# Patient Record
Sex: MALE | Race: WHITE | NOT HISPANIC OR LATINO | URBAN - METROPOLITAN AREA
[De-identification: names, ages, dates, MRNs, and addresses within clinical notes are randomized per-mention and may not be internally consistent; named-entity substitution may affect disease eponyms.]

---

## 2020-09-22 PROCEDURE — 88305 TISSUE EXAM BY PATHOLOGIST: CPT | Performed by: INTERNAL MEDICINE

## 2021-01-07 ENCOUNTER — LAB REQUISITION (OUTPATIENT)
Dept: LAB | Age: 66
End: 2021-01-07

## 2021-01-07 ENCOUNTER — EXTERNAL RECORD (OUTPATIENT)
Dept: OTHER | Age: 66
End: 2021-01-07

## 2021-01-07 DIAGNOSIS — E78.5 HYPERLIPIDEMIA, UNSPECIFIED: ICD-10-CM

## 2021-01-07 DIAGNOSIS — Z12.5 ENCOUNTER FOR SCREENING FOR MALIGNANT NEOPLASM OF PROSTATE: ICD-10-CM

## 2021-07-06 ENCOUNTER — OFFICE VISIT (OUTPATIENT)
Dept: FAMILY MEDICINE CLINIC | Facility: CLINIC | Age: 66
End: 2021-07-06
Payer: MEDICARE

## 2021-07-06 VITALS
HEIGHT: 66 IN | HEART RATE: 74 BPM | BODY MASS INDEX: 29.09 KG/M2 | SYSTOLIC BLOOD PRESSURE: 133 MMHG | DIASTOLIC BLOOD PRESSURE: 79 MMHG | WEIGHT: 181 LBS

## 2021-07-06 DIAGNOSIS — Z12.5 PROSTATE CANCER SCREENING: ICD-10-CM

## 2021-07-06 DIAGNOSIS — E78.5 HYPERLIPIDEMIA, UNSPECIFIED HYPERLIPIDEMIA TYPE: Primary | ICD-10-CM

## 2021-07-06 DIAGNOSIS — E55.9 VITAMIN D DEFICIENCY: ICD-10-CM

## 2021-07-06 PROCEDURE — 99202 OFFICE O/P NEW SF 15 MIN: CPT | Performed by: FAMILY MEDICINE

## 2021-07-06 RX ORDER — NAPROXEN 500 MG/1
TABLET ORAL
COMMUNITY
End: 2021-07-26

## 2021-07-06 RX ORDER — NAPROXEN 500 MG/1
500 TABLET ORAL 2 TIMES DAILY WITH MEALS
Qty: 60 TABLET | Refills: 1 | Status: SHIPPED | OUTPATIENT
Start: 2021-07-06 | End: 2021-07-26

## 2021-07-06 NOTE — PROGRESS NOTES
Blood pressure 133/79, pulse 74, height 5' 6\" (1.676 m), weight 181 lb (82.1 kg). Patient presents today for initial visit. Intermittent back pain none at this time. Gets relief with naproxen. He otherwise feels well. Denies chest pain or dyspnea.

## 2021-07-08 ENCOUNTER — LAB ENCOUNTER (OUTPATIENT)
Dept: LAB | Age: 66
End: 2021-07-08
Attending: FAMILY MEDICINE
Payer: MEDICARE

## 2021-07-08 DIAGNOSIS — Z12.5 PROSTATE CANCER SCREENING: ICD-10-CM

## 2021-07-08 DIAGNOSIS — E55.9 VITAMIN D DEFICIENCY: ICD-10-CM

## 2021-07-08 DIAGNOSIS — E78.5 HYPERLIPIDEMIA, UNSPECIFIED HYPERLIPIDEMIA TYPE: ICD-10-CM

## 2021-07-08 LAB
ALBUMIN SERPL-MCNC: 3.7 G/DL (ref 3.4–5)
ALBUMIN/GLOB SERPL: 1 {RATIO} (ref 1–2)
ALP LIVER SERPL-CCNC: 47 U/L
ALT SERPL-CCNC: 27 U/L
ANION GAP SERPL CALC-SCNC: 6 MMOL/L (ref 0–18)
AST SERPL-CCNC: 19 U/L (ref 15–37)
BILIRUB SERPL-MCNC: 0.6 MG/DL (ref 0.1–2)
BUN BLD-MCNC: 15 MG/DL (ref 7–18)
BUN/CREAT SERPL: 16.3 (ref 10–20)
CALCIUM BLD-MCNC: 9.2 MG/DL (ref 8.5–10.1)
CHLORIDE SERPL-SCNC: 108 MMOL/L (ref 98–112)
CHOLEST SMN-MCNC: 204 MG/DL (ref ?–200)
CO2 SERPL-SCNC: 27 MMOL/L (ref 21–32)
COMPLEXED PSA SERPL-MCNC: 0.61 NG/ML (ref ?–4)
CREAT BLD-MCNC: 0.92 MG/DL
GLOBULIN PLAS-MCNC: 3.8 G/DL (ref 2.8–4.4)
GLUCOSE BLD-MCNC: 89 MG/DL (ref 70–99)
HDLC SERPL-MCNC: 58 MG/DL (ref 40–59)
LDLC SERPL CALC-MCNC: 126 MG/DL (ref ?–100)
M PROTEIN MFR SERPL ELPH: 7.5 G/DL (ref 6.4–8.2)
NONHDLC SERPL-MCNC: 146 MG/DL (ref ?–130)
OSMOLALITY SERPL CALC.SUM OF ELEC: 292 MOSM/KG (ref 275–295)
PATIENT FASTING Y/N/NP: YES
PATIENT FASTING Y/N/NP: YES
POTASSIUM SERPL-SCNC: 4.7 MMOL/L (ref 3.5–5.1)
SODIUM SERPL-SCNC: 141 MMOL/L (ref 136–145)
TRIGL SERPL-MCNC: 115 MG/DL (ref 30–149)
TSI SER-ACNC: 1.17 MIU/ML (ref 0.36–3.74)
VLDLC SERPL CALC-MCNC: 20 MG/DL (ref 0–30)

## 2021-07-08 PROCEDURE — 36415 COLL VENOUS BLD VENIPUNCTURE: CPT

## 2021-07-08 PROCEDURE — 84443 ASSAY THYROID STIM HORMONE: CPT

## 2021-07-08 PROCEDURE — 80061 LIPID PANEL: CPT

## 2021-07-08 PROCEDURE — 82306 VITAMIN D 25 HYDROXY: CPT

## 2021-07-08 PROCEDURE — 80053 COMPREHEN METABOLIC PANEL: CPT

## 2021-07-09 LAB — 25(OH)D3 SERPL-MCNC: 24.8 NG/ML (ref 30–100)

## 2021-07-26 ENCOUNTER — HOSPITAL ENCOUNTER (OUTPATIENT)
Dept: GENERAL RADIOLOGY | Age: 66
Discharge: HOME | End: 2021-07-26
Attending: FAMILY MEDICINE
Payer: MEDICARE

## 2021-07-26 ENCOUNTER — OFFICE VISIT (OUTPATIENT)
Dept: FAMILY MEDICINE CLINIC | Facility: CLINIC | Age: 66
End: 2021-07-26
Payer: MEDICARE

## 2021-07-26 ENCOUNTER — TELEPHONE (OUTPATIENT)
Dept: FAMILY MEDICINE CLINIC | Facility: CLINIC | Age: 66
End: 2021-07-26

## 2021-07-26 VITALS
WEIGHT: 182.63 LBS | HEART RATE: 75 BPM | DIASTOLIC BLOOD PRESSURE: 80 MMHG | BODY MASS INDEX: 29.35 KG/M2 | SYSTOLIC BLOOD PRESSURE: 120 MMHG | HEIGHT: 66 IN

## 2021-07-26 DIAGNOSIS — E55.9 VITAMIN D DEFICIENCY: ICD-10-CM

## 2021-07-26 DIAGNOSIS — G89.29 CHRONIC BILATERAL LOW BACK PAIN, UNSPECIFIED WHETHER SCIATICA PRESENT: ICD-10-CM

## 2021-07-26 DIAGNOSIS — Z00.00 MEDICARE ANNUAL WELLNESS VISIT, INITIAL: Primary | ICD-10-CM

## 2021-07-26 DIAGNOSIS — M54.50 CHRONIC BILATERAL LOW BACK PAIN, UNSPECIFIED WHETHER SCIATICA PRESENT: ICD-10-CM

## 2021-07-26 DIAGNOSIS — E78.5 HYPERLIPIDEMIA, UNSPECIFIED HYPERLIPIDEMIA TYPE: ICD-10-CM

## 2021-07-26 PROCEDURE — 99213 OFFICE O/P EST LOW 20 MIN: CPT | Performed by: FAMILY MEDICINE

## 2021-07-26 PROCEDURE — 72110 X-RAY EXAM L-2 SPINE 4/>VWS: CPT | Performed by: FAMILY MEDICINE

## 2021-07-26 RX ORDER — METHYLPREDNISOLONE 4 MG/1
TABLET ORAL
Qty: 1 EACH | Refills: 1 | Status: SHIPPED | OUTPATIENT
Start: 2021-07-26 | End: 2021-08-23 | Stop reason: ALTCHOICE

## 2021-07-26 NOTE — PROGRESS NOTES
Blood pressure 120/80, pulse 75, height 5' 6\" (1.676 m), weight 182 lb 9.6 oz (82.8 kg). Patient presents today following up for chronic low back pain. No pain down his legs no trauma. No bowel or bladder dysfunction.   Has seen multiple back speciali

## 2021-07-26 NOTE — PATIENT INSTRUCTIONS
Instrucciones médicas anticipadas  Un formulario de instrucciones médicas anticipadas le permite a usted planear con anticipación el nivel de atención médica que quisiera recibir en seda de no poder expresar lo que desea.  Esta declaración describe el tra recuperará. · Entra en efecto solamente cuando usted ya no puede expresar hiren deseos. © 6805-5986 [employer] [address] Todos los derechos reservados.  Esta información no pretende sustituir la atención médica profesional. Sólo sanchez médico puede diag · Manténgase tan activo amauri pueda. Un buen equilibrio, flexibilidad, fortaleza y resistencia son producto del ejercicio, y todos juegan un papel importante en la prevención de las caídas.  Pregúntele a sanchez proveedor de [employer] qué tipo de actividade

## 2021-07-26 NOTE — TELEPHONE ENCOUNTER
Patient was seen in office today and left MRI original copy in office. He is requesting if he can get original copy back and for us to keep a copy of it. Please call back, let him know when he can come pick it up.

## 2021-08-16 ENCOUNTER — HOSPITAL ENCOUNTER (OUTPATIENT)
Dept: MRI IMAGING | Age: 66
Discharge: HOME | End: 2021-08-16
Attending: FAMILY MEDICINE
Payer: MEDICARE

## 2021-08-16 ENCOUNTER — TELEPHONE (OUTPATIENT)
Dept: FAMILY MEDICINE CLINIC | Facility: CLINIC | Age: 66
End: 2021-08-16

## 2021-08-16 DIAGNOSIS — G89.29 CHRONIC BILATERAL LOW BACK PAIN, UNSPECIFIED WHETHER SCIATICA PRESENT: ICD-10-CM

## 2021-08-16 DIAGNOSIS — M54.50 CHRONIC BILATERAL LOW BACK PAIN, UNSPECIFIED WHETHER SCIATICA PRESENT: ICD-10-CM

## 2021-08-16 PROCEDURE — 72148 MRI LUMBAR SPINE W/O DYE: CPT | Performed by: FAMILY MEDICINE

## 2021-08-16 NOTE — TELEPHONE ENCOUNTER
----- Message from Jose Andrade DO sent at 8/16/2021  4:46 PM CDT -----  Moderate stenosis seen on MRI please inquire if patient has had any success with physical therapy.

## 2021-08-23 ENCOUNTER — OFFICE VISIT (OUTPATIENT)
Dept: FAMILY MEDICINE CLINIC | Facility: CLINIC | Age: 66
End: 2021-08-23
Payer: MEDICARE

## 2021-08-23 VITALS
BODY MASS INDEX: 28.93 KG/M2 | HEART RATE: 84 BPM | HEIGHT: 66 IN | DIASTOLIC BLOOD PRESSURE: 80 MMHG | SYSTOLIC BLOOD PRESSURE: 129 MMHG | WEIGHT: 180 LBS

## 2021-08-23 DIAGNOSIS — E78.2 MIXED HYPERLIPIDEMIA: Primary | ICD-10-CM

## 2021-08-23 PROCEDURE — 99213 OFFICE O/P EST LOW 20 MIN: CPT | Performed by: FAMILY MEDICINE

## 2021-08-23 NOTE — PROGRESS NOTES
Blood pressure 129/80, pulse 84, height 5' 6\" (1.676 m), weight 180 lb (81.6 kg). Following up for chronic low back pain. No pain down the legs. Has not yet started physical therapy. Did not use Medrol Dosepak.     Objective    Spinal stenosis seen m

## 2021-09-01 ENCOUNTER — OFFICE VISIT (OUTPATIENT)
Dept: PHYSICAL THERAPY | Age: 66
End: 2021-09-01
Attending: FAMILY MEDICINE
Payer: MEDICARE

## 2021-09-01 DIAGNOSIS — M54.50 CHRONIC BILATERAL LOW BACK PAIN, UNSPECIFIED WHETHER SCIATICA PRESENT: ICD-10-CM

## 2021-09-01 DIAGNOSIS — G89.29 CHRONIC BILATERAL LOW BACK PAIN, UNSPECIFIED WHETHER SCIATICA PRESENT: ICD-10-CM

## 2021-09-01 PROCEDURE — 97110 THERAPEUTIC EXERCISES: CPT

## 2021-09-01 PROCEDURE — 97140 MANUAL THERAPY 1/> REGIONS: CPT

## 2021-09-01 PROCEDURE — 97161 PT EVAL LOW COMPLEX 20 MIN: CPT

## 2021-09-01 NOTE — PROGRESS NOTES
LUMBAR SPINE EVALUATION:   Referring Physician: Dr. Rehan Lopes  Diagnosis: Chronic bilateral low back pain, unspecified whether sciatica present (M54.5,G89.29)    Date of Service: 9/1/2021     PATIENT SUMMARY   Hannah Dickey is a 77year old y/o male who not currently seeing a chiropractor. Current functional limitations include: heavy lifting, prolonged activity, bending, twisting, squatting    Milad Fernandes describes prior level of function: pt has had pain on and off for 30 years.  Unlimited function with ex glut and core strength with lumbar compensation noted with glut activation. Decreased strength may place increased load on pt's spine and contribute to presentation. Functional deficits include but are not limited to bending, twisting, squatting, lifting. soreness after evaluation, modalities as needed [ice/heat], detrimental fear avoidance behaviors, importance of remaining active and instruction on initial HEP.   Today's Treatment: prone: PA lower thoracic and lumbar, sideglides throughout lumbar, STM lumb please contact me at Dept: [phone]    Sincerely,  Electronically signed by therapist: Gracie Castro, PT,DPT,SB-C2    Physician's certification required: Yes  I certify the need for these services furnished under this plan of treatment and while und

## 2021-09-08 ENCOUNTER — OFFICE VISIT (OUTPATIENT)
Dept: PHYSICAL THERAPY | Age: 66
End: 2021-09-08
Attending: FAMILY MEDICINE
Payer: MEDICARE

## 2021-09-08 PROCEDURE — 97110 THERAPEUTIC EXERCISES: CPT

## 2021-09-08 PROCEDURE — 97140 MANUAL THERAPY 1/> REGIONS: CPT

## 2021-09-08 NOTE — PROGRESS NOTES
Diagnosis: Chronic bilateral low back pain, unspecified whether sciatica present (M54.5,G89.29)    Insurance (Authorized # of Visits):  Medicare (8-10 per POC)     Authorizing Physician: Dr. Calvin Mueller  Next MD visit: none scheduled  Fall Risk: standard + alt hip abd BTB 20x R/L    Supine TrA + bridging on heels with hip abd BTB 20x     Manual Therapy Prone: PA lumbar and mid-lower thoracic, sideglides lumbar, STM lumbar paraspinals with focus on R     Neuromuscular Re-education        Current HEP:   9/1:

## 2021-09-10 ENCOUNTER — OFFICE VISIT (OUTPATIENT)
Dept: PHYSICAL THERAPY | Age: 66
End: 2021-09-10
Attending: FAMILY MEDICINE
Payer: MEDICARE

## 2021-09-10 PROCEDURE — 97110 THERAPEUTIC EXERCISES: CPT

## 2021-09-10 PROCEDURE — 97140 MANUAL THERAPY 1/> REGIONS: CPT

## 2021-09-10 NOTE — PROGRESS NOTES
Diagnosis: Chronic bilateral low back pain, unspecified whether sciatica present (M54.5,G89.29)    Insurance (Authorized # of Visits):  Medicare (8-10 per POC)     Authorizing Physician: Dr. Marshall Lance  Next MD visit: none scheduled  Fall Risk: standard 20x    Supine pelvic clocks 3<>9 20x    Supine isometric march 65cm ball     Supine SB bridge feet on ball 2x10    Standing glut med hip abd RTB 10x R/L    Standing hip ext RTB 10x R/L    Manual Therapy Prone: PA lumbar and mid-lower thoracic, sideglides l

## 2021-09-14 ENCOUNTER — OFFICE VISIT (OUTPATIENT)
Dept: PHYSICAL THERAPY | Age: 66
End: 2021-09-14
Attending: FAMILY MEDICINE
Payer: MEDICARE

## 2021-09-14 PROCEDURE — 97110 THERAPEUTIC EXERCISES: CPT

## 2021-09-14 NOTE — PROGRESS NOTES
Diagnosis: Chronic bilateral low back pain, unspecified whether sciatica present (M54.5,G89.29)    Insurance (Authorized # of Visits):  Medicare (8-10 per POC)     Authorizing Physician: Dr. Mira Jane  Next MD visit: none scheduled  Fall Risk: standard only x5mins    Supine pelvic clock 12<>6 20x    Supine pelvic clocks 3<>9 20x    Supine isometric march 65cm ball     Supine SB bridge feet on ball 2x10    Standing glut med hip abd RTB 10x R/L    Standing hip ext RTB 10x R/L NuStep lvl 7 LEs only x5mins

## 2021-09-17 ENCOUNTER — APPOINTMENT (OUTPATIENT)
Dept: PHYSICAL THERAPY | Age: 66
End: 2021-09-17
Attending: FAMILY MEDICINE
Payer: MEDICARE

## 2021-09-21 ENCOUNTER — OFFICE VISIT (OUTPATIENT)
Dept: PHYSICAL THERAPY | Age: 66
End: 2021-09-21
Attending: FAMILY MEDICINE
Payer: MEDICARE

## 2021-09-21 PROCEDURE — 97110 THERAPEUTIC EXERCISES: CPT

## 2021-09-21 NOTE — PROGRESS NOTES
Diagnosis: Chronic bilateral low back pain, unspecified whether sciatica present (M54.5,G89.29)    Insurance (Authorized # of Visits):  Medicare (8-10 per POC)     Authorizing Physician: Dr. Viktor Ring  Next MD visit: none scheduled  Fall Risk: standard oblique 65cm ball [id number]    Supine TrA + alt hip abd BTB 20x R/L    Supine TrA + bridging on heels with hip abd BTB 20x NuStep lvl 5 up to 7 LEs only x5mins    Supine pelvic clock 12<>6 20x    Supine pelvic clocks 3<>9 20x    Supine isometric march 65cm min  Total Treatment Time: 40 min

## 2021-09-23 ENCOUNTER — OFFICE VISIT (OUTPATIENT)
Dept: PHYSICAL THERAPY | Age: 66
End: 2021-09-23
Attending: FAMILY MEDICINE
Payer: MEDICARE

## 2021-09-23 PROCEDURE — 97140 MANUAL THERAPY 1/> REGIONS: CPT

## 2021-09-23 PROCEDURE — 97110 THERAPEUTIC EXERCISES: CPT

## 2021-09-23 NOTE — PROGRESS NOTES
Diagnosis: Chronic bilateral low back pain, unspecified whether sciatica present (M54.5,G89.29)    Insurance (Authorized # of Visits):  Medicare (8-10 per POC)     Authorizing Physician: Dr. Tonya Cordon  Next MD visit: none scheduled  Fall Risk: standard 9/14/21  Tx#: 4 Date: 9/21/21  Tx#: 5   Therapeutic exercises Supine pelvic clocks 12<>6 10x - pain    Prone on elbows gentle mobilization into extension 10x    Supine pelvic clock 12<>6 10x    Supine TrA activation [id number]    Supine TrA + hip add with ba isometric core and oblique 65cm ball, doorway pec stretch - handout issued  9/23: issues pt handout on exercises to perform if flareup: FELI mobilization, pelvic clocks, TrA + hip add, TrA + hip abd    Charges:  TherEx x2, Man x1    Total Timed Treatment: 45

## 2021-09-28 ENCOUNTER — OFFICE VISIT (OUTPATIENT)
Dept: PHYSICAL THERAPY | Age: 66
End: 2021-09-28
Attending: FAMILY MEDICINE
Payer: MEDICARE

## 2021-09-28 PROCEDURE — 97140 MANUAL THERAPY 1/> REGIONS: CPT

## 2021-09-28 PROCEDURE — 97110 THERAPEUTIC EXERCISES: CPT

## 2021-09-28 NOTE — PROGRESS NOTES
Diagnosis: Chronic bilateral low back pain, unspecified whether sciatica present (M54.5,G89.29)    Insurance (Authorized # of Visits):  Medicare (8-10 per POC)     Authorizing Physician: Dr. Tricia Lott MD visit: none scheduled  Fall Risk: standard with use of biofeedback cuff to ensure offloading of spine Isabel Mason pose sustained hold, progressing stretch as tolerated [id number] ea    Supine pelvic clocks 12<>6 20x     Supine TrA + hip add with ball [id number]    Supine TrA + hip abd BTB [id number] NuStep lvl

## 2021-09-30 ENCOUNTER — OFFICE VISIT (OUTPATIENT)
Dept: PHYSICAL THERAPY | Age: 66
End: 2021-09-30
Attending: FAMILY MEDICINE
Payer: MEDICARE

## 2021-09-30 PROCEDURE — 97110 THERAPEUTIC EXERCISES: CPT

## 2021-09-30 NOTE — PROGRESS NOTES
Diagnosis: Chronic bilateral low back pain, unspecified whether sciatica present (M54.5,G89.29)    Insurance (Authorized # of Visits):  Medicare (8-10 per POC)     Authorizing Physician: Dr. Hill Horn  Next MD visit: none scheduled  Fall Risk: standard 12<>6 10x - pain    Prone on elbows gentle mobilization into extension 10x    Supine pelvic clock 12<>6 10x    Supine TrA activation [id number]    Supine TrA + hip add with ball [id number]    Supine TrA + hip abd BTB [id number]    Supine TrA with use of biofeedb mobilization, pelvic clocks, TrA + hip add, TrA + hip abd  9/28: hunter pose sustained hold to tolerance - handout issued  9/30: seated piriformis stretch    Plan:  Hold on PT x2-3 weeks for pt to assess independence with home program. Re-assess symptoms a

## 2021-10-20 ENCOUNTER — OFFICE VISIT (OUTPATIENT)
Dept: PHYSICAL THERAPY | Age: 66
End: 2021-10-20
Attending: PHYSICAL MEDICINE & REHABILITATION
Payer: MEDICARE

## 2021-10-20 PROCEDURE — 97110 THERAPEUTIC EXERCISES: CPT

## 2021-10-20 NOTE — PROGRESS NOTES
Lizandro  Pt has attended 9 visits in Physical Therapy.      Diagnosis: Chronic bilateral low back pain, unspecified whether sciatica present (M54.5,G89.29)    Insurance (Authorized # of Visits):  Medicare (8-10 per POC)     Authorizing Physician: -/5     Right Left Comments   Hip Flexion (L2) 5/5      5/5     Knee Extension (L3) 5/5 5/5     Knee Flexion 5/5 5/5     Ankle DF (L4) 5/5 5/5     EHL (L5) 5/5 5/5     Ankle PF (S1) 5/5 5/5     Hip Abduction 5/5 5/5     Hip Extension 5/5 5/5     Glut med mobilization into extension 10x    Supine pelvic clock 12<>6 10x    Supine TrA activation [id number]    Supine TrA + hip add with ball [id number]    Supine TrA + hip abd BTB [id number]    Supine TrA with use of biofeedback cuff to ensure offloading of spine Chil issued  9/30: seated piriformis stretch    Charges:  TherEx x3    Total Timed Treatment: 45 min  Total Treatment Time: 45 min

## 2021-11-04 ENCOUNTER — APPOINTMENT (OUTPATIENT)
Dept: PHYSICAL THERAPY | Age: 66
End: 2021-11-04
Attending: PHYSICAL MEDICINE & REHABILITATION
Payer: MEDICARE

## 2022-07-05 ENCOUNTER — TELEMEDICINE (OUTPATIENT)
Dept: FAMILY MEDICINE CLINIC | Facility: CLINIC | Age: 67
End: 2022-07-05

## 2022-07-05 DIAGNOSIS — L08.9 BLISTERING DISTAL DACTYLITIS: Primary | ICD-10-CM

## 2022-07-05 RX ORDER — PREDNISONE 10 MG/1
10 TABLET ORAL 3 TIMES DAILY
Qty: 15 TABLET | Refills: 0 | Status: SHIPPED | OUTPATIENT
Start: 2022-07-05

## 2022-07-05 NOTE — PROGRESS NOTES
Video visit    Patient presents complaining of swelling of the left middle finger. He reports that he has not traumatized it. He did have a small splinter in it that he noticed and removed this morning. The swelling began several days ago there is no pain. It does become discolored at times turning bluish. He is always able to move it. He does play the guitar. Objective swelling noted to the left middle finger freely movable mild discoloration    Assessment dactylitis    Plan prednisone prescription follow-up in 2 days if no improvement.

## 2022-07-11 ENCOUNTER — OFFICE VISIT (OUTPATIENT)
Dept: FAMILY MEDICINE CLINIC | Facility: CLINIC | Age: 67
End: 2022-07-11
Payer: MEDICARE

## 2022-07-11 ENCOUNTER — HOSPITAL ENCOUNTER (OUTPATIENT)
Dept: GENERAL RADIOLOGY | Age: 67
Discharge: HOME | End: 2022-07-11
Attending: FAMILY MEDICINE
Payer: MEDICARE

## 2022-07-11 VITALS
SYSTOLIC BLOOD PRESSURE: 117 MMHG | HEART RATE: 69 BPM | HEIGHT: 66 IN | BODY MASS INDEX: 28.93 KG/M2 | DIASTOLIC BLOOD PRESSURE: 64 MMHG | WEIGHT: 180 LBS

## 2022-07-11 DIAGNOSIS — M79.89 FINGER SWELLING: ICD-10-CM

## 2022-07-11 DIAGNOSIS — Z12.5 PROSTATE CANCER SCREENING: ICD-10-CM

## 2022-07-11 DIAGNOSIS — E55.9 VITAMIN D DEFICIENCY: Primary | ICD-10-CM

## 2022-07-11 DIAGNOSIS — E78.2 MIXED HYPERLIPIDEMIA: ICD-10-CM

## 2022-07-11 PROCEDURE — 99213 OFFICE O/P EST LOW 20 MIN: CPT | Performed by: FAMILY MEDICINE

## 2022-07-11 PROCEDURE — 73130 X-RAY EXAM OF HAND: CPT | Performed by: FAMILY MEDICINE

## 2022-07-11 NOTE — PROGRESS NOTES
Blood pressure 117/64, pulse 69, height 5' 6\" (1.676 m), weight 180 lb (81.6 kg). Left middle finger still swollen. Since minimal relief with the prednisone no pain. He did pull a splinter out of it at some point after the swelling had begun but it was very small. No fever no chills no night sweats.   No other joint discomfort  Objective distal left middle finger with swelling    Assessment finger swelling    Plan x-ray ordered and referral to Dr. Franco Luna blood test ordered

## 2022-07-12 ENCOUNTER — LAB ENCOUNTER (OUTPATIENT)
Dept: LAB | Age: 67
End: 2022-07-12
Attending: FAMILY MEDICINE
Payer: MEDICARE

## 2022-07-12 DIAGNOSIS — E55.9 VITAMIN D DEFICIENCY: ICD-10-CM

## 2022-07-12 DIAGNOSIS — E78.2 MIXED HYPERLIPIDEMIA: ICD-10-CM

## 2022-07-12 DIAGNOSIS — Z12.5 PROSTATE CANCER SCREENING: ICD-10-CM

## 2022-07-12 LAB
ALBUMIN SERPL-MCNC: 3.4 G/DL (ref 3.4–5)
ALBUMIN/GLOB SERPL: 0.9 {RATIO} (ref 1–2)
ALP LIVER SERPL-CCNC: 56 U/L
ALT SERPL-CCNC: 23 U/L
ANION GAP SERPL CALC-SCNC: 4 MMOL/L (ref 0–18)
AST SERPL-CCNC: 19 U/L (ref 15–37)
BILIRUB SERPL-MCNC: 0.6 MG/DL (ref 0.1–2)
BUN BLD-MCNC: 14 MG/DL (ref 7–18)
BUN/CREAT SERPL: 14 (ref 10–20)
CALCIUM BLD-MCNC: 9.1 MG/DL (ref 8.5–10.1)
CHLORIDE SERPL-SCNC: 106 MMOL/L (ref 98–112)
CHOLEST SERPL-MCNC: 164 MG/DL (ref ?–200)
CO2 SERPL-SCNC: 27 MMOL/L (ref 21–32)
COMPLEXED PSA SERPL-MCNC: 0.56 NG/ML (ref ?–4)
CREAT BLD-MCNC: 1 MG/DL
FASTING PATIENT LIPID ANSWER: YES
FASTING STATUS PATIENT QL REPORTED: YES
GLOBULIN PLAS-MCNC: 3.7 G/DL (ref 2.8–4.4)
GLUCOSE BLD-MCNC: 82 MG/DL (ref 70–99)
HDLC SERPL-MCNC: 60 MG/DL (ref 40–59)
LDLC SERPL CALC-MCNC: 90 MG/DL (ref ?–100)
NONHDLC SERPL-MCNC: 104 MG/DL (ref ?–130)
OSMOLALITY SERPL CALC.SUM OF ELEC: 284 MOSM/KG (ref 275–295)
POTASSIUM SERPL-SCNC: 4 MMOL/L (ref 3.5–5.1)
PROT SERPL-MCNC: 7.1 G/DL (ref 6.4–8.2)
SODIUM SERPL-SCNC: 137 MMOL/L (ref 136–145)
TRIGL SERPL-MCNC: 70 MG/DL (ref 30–149)
TSI SER-ACNC: 1.2 MIU/ML (ref 0.36–3.74)
VIT D+METAB SERPL-MCNC: 28.6 NG/ML (ref 30–100)
VLDLC SERPL CALC-MCNC: 11 MG/DL (ref 0–30)

## 2022-07-12 PROCEDURE — 82306 VITAMIN D 25 HYDROXY: CPT

## 2022-07-12 PROCEDURE — 80061 LIPID PANEL: CPT

## 2022-07-12 PROCEDURE — 84443 ASSAY THYROID STIM HORMONE: CPT

## 2022-07-12 PROCEDURE — 80053 COMPREHEN METABOLIC PANEL: CPT

## 2022-07-12 PROCEDURE — 36415 COLL VENOUS BLD VENIPUNCTURE: CPT

## 2022-07-28 ENCOUNTER — OFFICE VISIT (OUTPATIENT)
Dept: SURGERY | Facility: CLINIC | Age: 67
End: 2022-07-28
Payer: MEDICARE

## 2022-07-28 DIAGNOSIS — M19.042 PRIMARY OSTEOARTHRITIS OF LEFT HAND: Primary | ICD-10-CM

## 2022-07-28 PROCEDURE — 99204 OFFICE O/P NEW MOD 45 MIN: CPT | Performed by: PLASTIC SURGERY

## 2022-10-10 RX ORDER — ERGOCALCIFEROL 1.25 MG/1
50000 CAPSULE ORAL WEEKLY
Qty: 12 CAPSULE | Refills: 0 | Status: SHIPPED | OUTPATIENT
Start: 2022-10-10

## 2022-10-10 NOTE — TELEPHONE ENCOUNTER
Please review.  Protocol failed/No protocol      Requested Prescriptions   Pending Prescriptions Disp Refills    ERGOCALCIFEROL 1.25 MG (58119 UT) Oral Cap [Pharmacy Med Name: VITAMIN D2 50,000IU (ERGO) CAP RX] 12 capsule 0     Sig: TAKE 1 CAPSULE BY MOUTH 1 TIME A WEEK        There is no refill protocol information for this order           Recent Outpatient Visits              2 months ago Primary osteoarthritis of left hand    [facility],[address] Floor, [address] [place] [address],[address], [place] Farhana Koch MD    Office Visit    3 months ago Vitamin D deficiency    Nic Arevalo DO    Office Visit    3 months ago Blistering distal dactylitis    Nic Arevalo DO    Telemedicine    11 months ago     [facility]    Office Visit    1 year ago     [facility] [place], [place]    Office Visit

## 2023-07-17 ENCOUNTER — NURSE TRIAGE (OUTPATIENT)
Dept: FAMILY MEDICINE CLINIC | Facility: CLINIC | Age: 68
End: 2023-07-17

## 2023-07-17 DIAGNOSIS — H53.9 VISION CHANGES: Primary | ICD-10-CM

## 2023-07-17 NOTE — TELEPHONE ENCOUNTER
Action Requested: Summary for Provider     []  Critical Lab, Recommendations Needed  [] Need Additional Advice  []   FYI    []   Need Orders  [] Need Medications Sent to Pharmacy  []  Other     SUMMARY: Pt calling, states when a bright light flashes, he suddenly goes blind, then vision comes back. Occurred 3 times in the last few weeks. Denies pain, swelling, discharge, recent injuries. Pt initially requesting for an ophthalmologist referral.  [facility] ophthalmology department, spoke with Bonnie Cushing, call transferred to Bonnie Cushing to speak with Pt and recommends to initiate referral for Dr. Marissa Maloney for evaluation. Please advise. Referral pended. To provider in [address] [place] [address] for Dr. Briana Low.       Reason for call: Eye Problem (Right eye-bright light, goes blind)  Onset: Data Unavailable    Future Appointments   Date Time Provider Dagoberto Becerra   7/20/2023  1:30 PM Marisol Gutierrez MD [facility]                      Reason for Disposition   Blurred vision or visual changes and gradual onset (e.g., weeks, months)    Protocols used: Vision Loss or Change-A-OH

## 2023-07-20 ENCOUNTER — OFFICE VISIT (OUTPATIENT)
Dept: OPHTHALMOLOGY | Facility: CLINIC | Age: 68
End: 2023-07-20

## 2023-07-20 DIAGNOSIS — H25.13 AGE-RELATED NUCLEAR CATARACT OF BOTH EYES: ICD-10-CM

## 2023-07-20 DIAGNOSIS — H53.9 VISUAL DISTURBANCE: Primary | ICD-10-CM

## 2023-07-20 PROCEDURE — 92004 COMPRE OPH EXAM NEW PT 1/>: CPT | Performed by: OPHTHALMOLOGY

## 2023-07-20 NOTE — PROGRESS NOTES
Siobhan Boyd is a 79year old male. HPI:     HPI    New patient complains that if someone shines a bright light in his eyes he loses his sight for about 30 seconds in the right eye. This started about a month ago. He states it doesn't happen in the left eye, and it doesn't happen all the time. He also complains that he notices floaters described as clear worms in the right eye for a couple of months which is random and doesn't happen with bright light in eyes. Consult: Jeanna Burton (PA)    Last edited by Tate Abreu O.T. on 7/20/2023  2:02 PM.        Patient History:  Past Medical History:   Diagnosis Date    Back pain 04/05/2007    Hyperlipidemia        Surgical History: Siobhan Boyd has a past surgical history that includes colonoscopy (09/2020); colonoscopy (N/A, 9/22/2020) (Procedure: COLONOSCOPY, POSSIBLE BIOPSY, POSSIBLE POLYPECTOMY 26274;  Surgeon: Easton Munguia MD;  Location: [address] [place] [address]); and arthroscopy, shoulder, surgi (Right, 04/02/2008) (03/23/2009, 11/25/2009, ). Family History   Problem Relation Age of Onset    Heart Disorder Mother 80        Heart Attack    Other (Tumor) Father 76        Brain Tumor    Heart Disorder Sister Viktor Nathan        [facility] actual Dx name    Heart Disorder Brother Hoang Ambershire        Quartupal Bypas    Stroke Brother 58    Diabetes Neg     Glaucoma Neg     Macular degeneration Neg        Social History:   Social History     Socioeconomic History    Marital status:    Tobacco Use    Smoking status: Never    Smokeless tobacco: Never   Vaping Use    Vaping Use: Never used   Substance and Sexual Activity    Alcohol use: Yes     Alcohol/week: 1.0 standard drink of alcohol     Types: 1 Cans of beer per week     Comment: social    Drug use: Never       Medications:  Current Outpatient Medications   Medication Sig Dispense Refill    Multiple Vitamin (MULTIVITAMIN TAB/CAP) Take by mouth daily.         B Complex Oral Tab 1 tablet daily      Garlic-DHA-EPA (GARLIC/EPA OR) garlic      ergocalciferol 1.25 MG (82194 UT) Oral Cap Take 1 capsule (50,000 Units total) by mouth once a week. 12 capsule 0       Allergies:  No Known Allergies    ROS:     ROS    Positive for: Eyes  Negative for: Constitutional, Gastrointestinal, Neurological, Skin, Genitourinary, Musculoskeletal, HENT, Endocrine, Cardiovascular, Respiratory, Psychiatric, Allergic/Imm, Heme/Lymph  Last edited by Ana Fan [email or web address] on 7/20/2023  2:04 PM.          PHYSICAL EXAM:     Base Eye Exam       Visual Acuity (Snellen - Linear)         Right Left    Dist sc 20/20 20/20              Tonometry (Icare, 2:00 PM)         Right Left    Pressure 16 17              Pupils         Pupils    Right PERRL    Left PERRL              Dilation       Both eyes: 1.0% Mydriacyl and 2.5% Azael Synephrine x2 @ 2:00 PM                  Additional Tests       Amsler         Right Left     Normal Normal                  Slit Lamp and Fundus Exam       Slit Lamp Exam         Right Left    Lids/Lashes Dermatochalasis, Meibomian gland dysfunction Dermatochalasis, Meibomian gland dysfunction    Conjunctiva/Sclera Normal Normal    Cornea 1+ Arcus 1+ Arcus    Anterior Chamber Deep and quiet Deep and quiet    Iris Normal Normal    Lens 1+ Nuclear sclerosis 1+ Nuclear sclerosis    Vitreous Clear, no floater seen Clear, no floater seen              Fundus Exam         Right Left    Disc Good rim, Temporal crescent Good rim, Temporal crescent    C/D Ratio 0.6 0.6    Macula Normal Normal    Vessels Normal Normal    Periphery Normal Normal                     ASSESSMENT/PLAN:     Diagnoses and Plan:     Visual disturbance  No cause found for visual disturbance in the right eye. Reassured patient that there is no retina issues, no optic nerve issues, no infection, no inflammation or increased pressure in his eyes. Information given to see Neuro-Ophthalmology for consultation and treatment.  Patient will call if he wants a second opinion. Age-related nuclear cataract of both eyes  Discussed early cataracts with patient. Told patient that cataracts are age appropriate and they are not surgical at this time. No treatment recommended at this time. No orders of the defined types were placed in this encounter.       Meds This Visit:  Requested Prescriptions      No prescriptions requested or ordered in this encounter        Follow up instructions:  Return in about 1 year (around 7/20/2024) for EE.    7/20/2023  Scribed by: Yara Chavira MD

## 2023-07-20 NOTE — ASSESSMENT & PLAN NOTE
No cause found for visual disturbance in the right eye. Reassured patient that there is no retina issues, no optic nerve issues, no infection, no inflammation or increased pressure in his eyes. Information given to see Neuro-Ophthalmology for consultation and treatment. Patient will call if he wants a second opinion.

## 2023-07-20 NOTE — PATIENT INSTRUCTIONS
Visual disturbance  No cause found for visual disturbance in the right eye. Reassured patient that there is no retina issues, no optic nerve issues, no infection, no inflammation or increased pressure in his eyes. Information given to see Neuro-Ophthalmology for consultation and treatment. Patient will call if he wants a second opinion. Age-related nuclear cataract of both eyes  Discussed early cataracts with patient. Told patient that cataracts are age appropriate and they are not surgical at this time. No treatment recommended at this time.

## 2024-04-02 ENCOUNTER — HOSPITAL ENCOUNTER (INPATIENT)
Facility: HOSPITAL | Age: 69
LOS: 2 days | Discharge: HOME | End: 2024-04-05
Attending: EMERGENCY MEDICINE | Admitting: STUDENT IN AN ORGANIZED HEALTH CARE EDUCATION/TRAINING PROGRAM
Payer: MEDICARE

## 2024-04-02 ENCOUNTER — APPOINTMENT (OUTPATIENT)
Dept: INTERVENTIONAL RADIOLOGY/VASCULAR | Facility: HOSPITAL | Age: 69
End: 2024-04-02
Attending: INTERNAL MEDICINE
Payer: MEDICARE

## 2024-04-02 ENCOUNTER — APPOINTMENT (OUTPATIENT)
Dept: GENERAL RADIOLOGY | Facility: HOSPITAL | Age: 69
End: 2024-04-02
Attending: EMERGENCY MEDICINE
Payer: MEDICARE

## 2024-04-02 DIAGNOSIS — I21.3 ACUTE ST ELEVATION MYOCARDIAL INFARCTION (STEMI), UNSPECIFIED ARTERY (HCC): Primary | ICD-10-CM

## 2024-04-02 LAB
ALBUMIN SERPL-MCNC: 4.7 G/DL (ref 3.2–4.8)
ALBUMIN/GLOB SERPL: 1.3 {RATIO} (ref 1–2)
ALP LIVER SERPL-CCNC: 65 U/L
ALT SERPL-CCNC: 23 U/L
ANION GAP SERPL CALC-SCNC: 7 MMOL/L (ref 0–18)
APTT PPP: 26.8 SECONDS (ref 23.3–35.6)
AST SERPL-CCNC: 47 U/L (ref ?–34)
BASOPHILS # BLD AUTO: 0.03 X10(3) UL (ref 0–0.2)
BASOPHILS NFR BLD AUTO: 0.4 %
BILIRUB SERPL-MCNC: 0.4 MG/DL (ref 0.2–1.1)
BNP SERPL-MCNC: 18 PG/ML
BUN BLD-MCNC: 18 MG/DL (ref 9–23)
BUN/CREAT SERPL: 15.9 (ref 10–20)
CALCIUM BLD-MCNC: 10 MG/DL (ref 8.7–10.4)
CHLORIDE SERPL-SCNC: 107 MMOL/L (ref 98–112)
CHOLEST SERPL-MCNC: 210 MG/DL (ref ?–200)
CO2 SERPL-SCNC: 28 MMOL/L (ref 21–32)
CREAT BLD-MCNC: 1.13 MG/DL
DEPRECATED RDW RBC AUTO: 46.8 FL (ref 35.1–46.3)
EGFRCR SERPLBLD CKD-EPI 2021: 71 ML/MIN/1.73M2 (ref 60–?)
EOSINOPHIL # BLD AUTO: 0.13 X10(3) UL (ref 0–0.7)
EOSINOPHIL NFR BLD AUTO: 1.8 %
ERYTHROCYTE [DISTWIDTH] IN BLOOD BY AUTOMATED COUNT: 13 % (ref 11–15)
GLOBULIN PLAS-MCNC: 3.5 G/DL (ref 2.8–4.4)
GLUCOSE BLD-MCNC: 112 MG/DL (ref 70–99)
HCT VFR BLD AUTO: 46.9 %
HDLC SERPL-MCNC: 62 MG/DL (ref 40–59)
HGB BLD-MCNC: 15.5 G/DL
IMM GRANULOCYTES # BLD AUTO: 0.02 X10(3) UL (ref 0–1)
IMM GRANULOCYTES NFR BLD: 0.3 %
LDLC SERPL CALC-MCNC: 115 MG/DL (ref ?–100)
LYMPHOCYTES # BLD AUTO: 3.21 X10(3) UL (ref 1–4)
LYMPHOCYTES NFR BLD AUTO: 45.1 %
MCH RBC QN AUTO: 32.4 PG (ref 26–34)
MCHC RBC AUTO-ENTMCNC: 33 G/DL (ref 31–37)
MCV RBC AUTO: 97.9 FL
MONOCYTES # BLD AUTO: 0.68 X10(3) UL (ref 0.1–1)
MONOCYTES NFR BLD AUTO: 9.6 %
NEUTROPHILS # BLD AUTO: 3.04 X10 (3) UL (ref 1.5–7.7)
NEUTROPHILS # BLD AUTO: 3.04 X10(3) UL (ref 1.5–7.7)
NEUTROPHILS NFR BLD AUTO: 42.8 %
NONHDLC SERPL-MCNC: 148 MG/DL (ref ?–130)
OSMOLALITY SERPL CALC.SUM OF ELEC: 297 MOSM/KG (ref 275–295)
PLATELET # BLD AUTO: 197 10(3)UL (ref 150–450)
POTASSIUM SERPL-SCNC: 4 MMOL/L (ref 3.5–5.1)
PROT SERPL-MCNC: 8.2 G/DL (ref 5.7–8.2)
RBC # BLD AUTO: 4.79 X10(6)UL
SODIUM SERPL-SCNC: 142 MMOL/L (ref 136–145)
TRIGL SERPL-MCNC: 190 MG/DL (ref 30–149)
TROPONIN I SERPL HS-MCNC: 2470 NG/L
TSI SER-ACNC: 2.86 MIU/ML (ref 0.55–4.78)
VLDLC SERPL CALC-MCNC: 33 MG/DL (ref 0–30)
WBC # BLD AUTO: 7.1 X10(3) UL (ref 4–11)

## 2024-04-02 PROCEDURE — B2111ZZ FLUOROSCOPY OF MULTIPLE CORONARY ARTERIES USING LOW OSMOLAR CONTRAST: ICD-10-PCS | Performed by: INTERNAL MEDICINE

## 2024-04-02 PROCEDURE — 4A023N7 MEASUREMENT OF CARDIAC SAMPLING AND PRESSURE, LEFT HEART, PERCUTANEOUS APPROACH: ICD-10-PCS | Performed by: INTERNAL MEDICINE

## 2024-04-02 PROCEDURE — B240ZZ3 ULTRASONOGRAPHY OF SINGLE CORONARY ARTERY, INTRAVASCULAR: ICD-10-PCS | Performed by: INTERNAL MEDICINE

## 2024-04-02 PROCEDURE — 027034Z DILATION OF CORONARY ARTERY, ONE ARTERY WITH DRUG-ELUTING INTRALUMINAL DEVICE, PERCUTANEOUS APPROACH: ICD-10-PCS | Performed by: INTERNAL MEDICINE

## 2024-04-02 PROCEDURE — B2151ZZ FLUOROSCOPY OF LEFT HEART USING LOW OSMOLAR CONTRAST: ICD-10-PCS | Performed by: INTERNAL MEDICINE

## 2024-04-02 PROCEDURE — 71045 X-RAY EXAM CHEST 1 VIEW: CPT | Performed by: EMERGENCY MEDICINE

## 2024-04-02 PROCEDURE — 99223 1ST HOSP IP/OBS HIGH 75: CPT | Performed by: HOSPITALIST

## 2024-04-02 RX ORDER — NITROGLYCERIN 20 MG/100ML
INJECTION INTRAVENOUS
Status: COMPLETED | OUTPATIENT
Start: 2024-04-02 | End: 2024-04-02

## 2024-04-02 RX ORDER — NITROGLYCERIN 20 MG/100ML
INJECTION INTRAVENOUS
Status: COMPLETED
Start: 2024-04-02 | End: 2024-04-02

## 2024-04-02 RX ORDER — ASPIRIN 81 MG/1
324 TABLET, CHEWABLE ORAL ONCE
Status: COMPLETED | OUTPATIENT
Start: 2024-04-02 | End: 2024-04-02

## 2024-04-02 RX ORDER — HEPARIN SODIUM AND DEXTROSE 10000; 5 [USP'U]/100ML; G/100ML
INJECTION INTRAVENOUS CONTINUOUS
Status: DISCONTINUED | OUTPATIENT
Start: 2024-04-03 | End: 2024-04-03

## 2024-04-02 RX ORDER — ADENOSINE 3 MG/ML
INJECTION, SOLUTION INTRAVENOUS
Status: COMPLETED
Start: 2024-04-02 | End: 2024-04-02

## 2024-04-02 RX ORDER — HEPARIN SODIUM 1000 [USP'U]/ML
60 INJECTION, SOLUTION INTRAVENOUS; SUBCUTANEOUS ONCE
Status: COMPLETED | OUTPATIENT
Start: 2024-04-02 | End: 2024-04-02

## 2024-04-02 RX ORDER — HEPARIN SODIUM AND DEXTROSE 10000; 5 [USP'U]/100ML; G/100ML
12 INJECTION INTRAVENOUS ONCE
Status: COMPLETED | OUTPATIENT
Start: 2024-04-02 | End: 2024-04-03

## 2024-04-02 RX ORDER — METOPROLOL TARTRATE 1 MG/ML
INJECTION, SOLUTION INTRAVENOUS
Status: DISCONTINUED
Start: 2024-04-02 | End: 2024-04-02 | Stop reason: WASHOUT

## 2024-04-02 RX ORDER — MIDAZOLAM HYDROCHLORIDE 1 MG/ML
INJECTION INTRAMUSCULAR; INTRAVENOUS
Status: COMPLETED
Start: 2024-04-02 | End: 2024-04-02

## 2024-04-02 RX ORDER — LIDOCAINE HYDROCHLORIDE 20 MG/ML
INJECTION, SOLUTION EPIDURAL; INFILTRATION; INTRACAUDAL; PERINEURAL
Status: COMPLETED
Start: 2024-04-02 | End: 2024-04-02

## 2024-04-02 RX ORDER — METOPROLOL TARTRATE 1 MG/ML
INJECTION, SOLUTION INTRAVENOUS
Status: COMPLETED | OUTPATIENT
Start: 2024-04-02 | End: 2024-04-02

## 2024-04-02 RX ORDER — HEPARIN SODIUM 1000 [USP'U]/ML
INJECTION, SOLUTION INTRAVENOUS; SUBCUTANEOUS
Status: COMPLETED
Start: 2024-04-02 | End: 2024-04-02

## 2024-04-02 RX ORDER — NITROGLYCERIN 0.4 MG/1
TABLET SUBLINGUAL
Status: COMPLETED | OUTPATIENT
Start: 2024-04-02 | End: 2024-04-02

## 2024-04-03 ENCOUNTER — APPOINTMENT (OUTPATIENT)
Dept: CV DIAGNOSTICS | Facility: HOSPITAL | Age: 69
End: 2024-04-03
Attending: INTERNAL MEDICINE
Payer: MEDICARE

## 2024-04-03 LAB
APTT PPP: 111.4 SECONDS (ref 23.3–35.6)
ATRIAL RATE: 65 BPM
ATRIAL RATE: 99 BPM
ISTAT ACTIVATED CLOTTING TIME: 217 SECONDS (ref 125–137)
ISTAT ACTIVATED CLOTTING TIME: 282 SECONDS (ref 125–137)
P AXIS: 66 DEGREES
P AXIS: 73 DEGREES
P-R INTERVAL: 140 MS
P-R INTERVAL: 146 MS
Q-T INTERVAL: 330 MS
Q-T INTERVAL: 374 MS
QRS DURATION: 66 MS
QRS DURATION: 86 MS
QTC CALCULATION (BEZET): 388 MS
QTC CALCULATION (BEZET): 423 MS
R AXIS: 47 DEGREES
R AXIS: 7 DEGREES
T AXIS: 155 DEGREES
T AXIS: 71 DEGREES
VENTRICULAR RATE: 65 BPM
VENTRICULAR RATE: 99 BPM

## 2024-04-03 PROCEDURE — 93306 TTE W/DOPPLER COMPLETE: CPT | Performed by: INTERNAL MEDICINE

## 2024-04-03 PROCEDURE — 99223 1ST HOSP IP/OBS HIGH 75: CPT | Performed by: INTERNAL MEDICINE

## 2024-04-03 PROCEDURE — 99233 SBSQ HOSP IP/OBS HIGH 50: CPT | Performed by: INTERNAL MEDICINE

## 2024-04-03 RX ORDER — HEPARIN SODIUM 5000 [USP'U]/ML
5000 INJECTION, SOLUTION INTRAVENOUS; SUBCUTANEOUS EVERY 12 HOURS SCHEDULED
Status: DISCONTINUED | OUTPATIENT
Start: 2024-04-03 | End: 2024-04-05

## 2024-04-03 RX ORDER — WATER 10 ML/10ML
INJECTION INTRAMUSCULAR; INTRAVENOUS; SUBCUTANEOUS
Status: COMPLETED
Start: 2024-04-03 | End: 2024-04-03

## 2024-04-03 RX ORDER — ATORVASTATIN CALCIUM 80 MG/1
80 TABLET, FILM COATED ORAL NIGHTLY
Status: DISCONTINUED | OUTPATIENT
Start: 2024-04-03 | End: 2024-04-05

## 2024-04-03 RX ORDER — ASPIRIN 81 MG/1
81 TABLET ORAL DAILY
Status: DISCONTINUED | OUTPATIENT
Start: 2024-04-04 | End: 2024-04-05

## 2024-04-03 RX ORDER — HYDROCODONE BITARTRATE AND ACETAMINOPHEN 5; 325 MG/1; MG/1
1 TABLET ORAL EVERY 4 HOURS PRN
Status: DISCONTINUED | OUTPATIENT
Start: 2024-04-03 | End: 2024-04-05

## 2024-04-03 RX ORDER — SODIUM CHLORIDE 9 MG/ML
INJECTION, SOLUTION INTRAVENOUS CONTINUOUS
Status: ACTIVE | OUTPATIENT
Start: 2024-04-03 | End: 2024-04-03

## 2024-04-03 RX ORDER — ACETAMINOPHEN 500 MG
1000 TABLET ORAL EVERY 6 HOURS PRN
Status: DISCONTINUED | OUTPATIENT
Start: 2024-04-03 | End: 2024-04-05

## 2024-04-03 RX ORDER — HYDROMORPHONE HYDROCHLORIDE 1 MG/ML
0.5 INJECTION, SOLUTION INTRAMUSCULAR; INTRAVENOUS; SUBCUTANEOUS EVERY 2 HOUR PRN
Status: DISCONTINUED | OUTPATIENT
Start: 2024-04-03 | End: 2024-04-05

## 2024-04-03 NOTE — OPERATIVE REPORT
Full note dictated,    50 to 70% circumflex  Large ramus is patent  Smaller circumflex 100% occluded proximally  No significant disease in a large right coronary artery    Circumflex recanalized and stented with 2.5 x 16 mm NENA.  This extended from just at the AV branch backward.  Smaller posterolateral branch had minimal disease and was not covered as the sizing was approximately 2.25 mm.    No reflow was noted.  Stent was optimized using a 3 mm balloon.  Intravascular ultrasound interrogation revealed no evidence of dissection proximally or distally.  Multiple aliquots of adenosine and nitroglycerin were given via a catheter into the circumflex both proximal mid and distal to the stent.  With this, patient had return back to CLINTON II and a half to CLINTON-3 flow.  As there was no chest discomfort, and resolution of EKG changes I decided to terminate the procedure.    Note, cangrelor was also started.  We will continue this through the morning and switch over.  Does have at least a moderate proximal LAD stenosis which may need to be assessed prior to discharge.      Manolo Sanchez MD

## 2024-04-03 NOTE — CONSULTS
[facility] Cardiology  Report of Consultation    Glenn Torres Patient Status:  Emergency    1955 MRN [id number]   Location [facility] EMERGENCY DEPARTMENT Attending Jaja Jacobson MD   Hosp Day # 0 PCP Jose Croft DO     Reason for Consultation:  Inferior wall MI    History of Present Illness:  Glenn Torres is a a(n) 68 year old male with previous history of hyperlipidemia who presented today complaining of chest discomfort which he describes as a bad heartburn.  Pain apparently started about 3 hours ago it has been continuous.  It is currently about a 4 out of 10.    Patient had episodes of pain yesterday that came and went.    Patient denies any previous cardiac history.  Denies any recent illnesses.  He has not had any history of stroke or any bleeding issues.  I there has been no fevers chills or cough.    History:  Past Medical History:   Diagnosis Date    Back pain 2007    Hyperlipidemia      Past Surgical History:   Procedure Laterality Date    ARTHROSCOPY, SHOULDER, SURGI Right 2008, 2009,     COLONOSCOPY  2020    COLONOSCOPY N/A 2020    Procedure: COLONOSCOPY, POSSIBLE BIOPSY, POSSIBLE POLYPECTOMY 43600;  Surgeon: Rivas Ledesma MD;  Location: [facility], [facility]     Family History   Problem Relation Age of Onset    Heart Disorder Mother 83        Heart Attack    Other (Tumor) Father 75        Brain Tumor    Heart Disorder Sister 65         actual Dx name    Heart Disorder Brother 65        Quartupal Bypas    Stroke Brother 62    Diabetes Neg     Glaucoma Neg     Macular degeneration Neg       reports that he has never smoked. He has never used smokeless tobacco. He reports current alcohol use of about 1.0 standard drink of alcohol per week. He reports that he does not use drugs.    Allergies:  No Known Allergies    Medications:    Current Facility-Administered Medications:     nitroglycerin (Nitrostat) SL tab, , Sublingual,  Code/Trauma Med    metoprolol (Lopressor) 5 mg/5mL injection, , Intravenous, Code/Trauma Med    heparin (Porcine) 48149 units/250mL infusion ED INITIAL DOSE, 12 Units/kg/hr, Intravenous, Once    [START ON 4/3/2024] heparin (Porcine) 25133 units/250mL infusion ACS/AFIB CONTINUOUS, 200-3,000 Units/hr, Intravenous, Continuous    nitroGLYCERIN in dextrose 5% 50 mg/250mL infusion premix, , Intravenous, Code/Trauma Continuous Med    Review of Systems:  A comprehensive 10 point review of systems was completed.  Pertinent positives and negatives noted in the the HPI.     Physical Exam:  Blood pressure (!) 150/100, pulse 63, temperature 97.5 °F (36.4 °C), temperature source Temporal, resp. rate 11, height 5' 6\" (1.676 m), weight 165 lb (74.8 kg), SpO2 98%.  Temp (24hrs), Av.5 °F (36.4 °C), Min:97.5 °F (36.4 °C), Max:97.5 °F (36.4 °C)    Wt Readings from Last 3 Encounters:   24 165 lb (74.8 kg)   22 180 lb (81.6 kg)   21 180 lb (81.6 kg)       Telemetry: Sinus  General: Alert and oriented in no apparent distress.  HEENT: EOMI, no scleral icterus, mucosa moist  Neck: Supple, carotids 2+   Cardiac: Regular rate and rhythm   Lungs: Clear   Abdomen: Soft, non-tender.   Extremities: Without clubbing, cyanosis or edema  Neurologic: Alert and oriented, normal affect.  Skin: Warm and dry.     Laboratories and Data:  Diagnostics:    Labs:   No results for input(s): \"TROP\", \"CK\" in the last 168 hours.   Recent Labs   Lab 24  2210   RBC 4.79   HGB 15.5   HCT 46.9   MCV 97.9   MCH 32.4   MCHC 33.0   RDW 13.0   NEPRELIM 3.04   WBC 7.1   .0     Recent Labs   Lab 24  2210   *   BUN 18   CREATSERUM 1.13   CA 10.0   ALB 4.7      K 4.0      CO2 28.0   ALKPHO 65   AST 47*   ALT 23   BILT 0.4   TP 8.2      No results found for: \"PT\", \"INR\"     Assessment/Plan:    CV -patient presents with chest discomfort and EKG changes suggest an inferior posterior myocardial infarction.  Case been  discussed with patient and family at bedside.  I recommended urgent angiography.   Risks, benefits, and alternatives of cardiac cath/PCI discussed in detail.  Risks include but not limited to death, MI, CVA, emergency CABG, transfusion, and surgical repair of vascular complications. Questions answered.  Patient agrees to proceed.  2.    Lipids -patient will need high-dose statins      Manolo Sanchez MD  4/2/2024  10:48 PM  C5

## 2024-04-03 NOTE — ED PROVIDER NOTES
Patient Seen in: [facility] Emergency Department      History     Chief Complaint   Patient presents with    Chest Pain     Stated Complaint: chest pain    Subjective:   HPI    The patient is a 68-year-old male with high cholesterol who presents with intermittent substernal chest pain since yesterday.  Tonight it became worse with associated diaphoresis.  No nausea or shortness of breath.  No pleuritic pain.  No leg pain or swelling.  No recent illness cough fevers or chills    Objective:   Past Medical History:   Diagnosis Date    Back pain 04/05/2007    Hyperlipidemia               No pertinent past surgical history.          Social history: No smoking  Family history.  Brother with quadruple bypass in his 60s  No pertinent social history.            Review of Systems    Positive for stated complaint: chest pain  Other systems are as noted in HPI.  Constitutional and vital signs reviewed.      All other systems reviewed and negative except as noted above.    Physical Exam     ED Triage Vitals   BP 04/02/24 2157 (!) 169/88   Pulse 04/02/24 2157 70   Resp 04/02/24 2157 20   Temp 04/02/24 2211 97.5 °F (36.4 °C)   Temp src 04/02/24 2211 Temporal   SpO2 04/02/24 2157 99 %   O2 Device 04/02/24 2157 None (Room air)       Current:/83 (BP Location: Left arm)   Pulse 58   Temp 97.6 °F (36.4 °C) (Temporal)   Resp 14   Ht 167.6 cm (5' 6\")   Wt 76.2 kg   SpO2 97%   BMI 27.11 kg/m²         Physical Exam  Vitals and nursing note reviewed.   Constitutional:       General: He is not in acute distress.     Appearance: Normal appearance. He is well-developed. He is not ill-appearing.   HENT:      Head: Normocephalic and atraumatic.      Mouth/Throat:      Mouth: Mucous membranes are moist.   Eyes:      Conjunctiva/sclera: Conjunctivae normal.      Pupils: Pupils are equal, round, and reactive to light.   Neck:      Vascular: No JVD.   Cardiovascular:      Rate and Rhythm: Normal rate and regular rhythm.       Heart sounds: Normal heart sounds. No murmur heard.  Pulmonary:      Effort: Pulmonary effort is normal.      Breath sounds: Normal breath sounds.   Abdominal:      General: Bowel sounds are normal. There is no distension.      Palpations: Abdomen is soft. There is no mass.      Tenderness: There is no abdominal tenderness. There is no guarding or rebound.   Musculoskeletal:         General: Normal range of motion.      Cervical back: Normal range of motion and neck supple.      Right lower leg: No tenderness. No edema.      Left lower leg: No tenderness. No edema.   Skin:     General: Skin is warm and dry.      Capillary Refill: Capillary refill takes less than 2 seconds.      Findings: No rash.   Neurological:      General: No focal deficit present.      Mental Status: He is alert and oriented to person, place, and time.      Deep Tendon Reflexes: Reflexes are normal and symmetric.   Psychiatric:         Judgment: Judgment normal.       Differential diagnosis includes but is not limited to acute coronary syndrome, GERD, pneumonia, pleurisy        ED Course     Labs Reviewed   COMP METABOLIC PANEL (14) - Abnormal; Notable for the following components:       Result Value    Glucose 112 (*)     Calculated Osmolality 297 (*)     AST 47 (*)     All other components within normal limits   LIPID PANEL - Abnormal; Notable for the following components:    Cholesterol, Total 210 (*)     HDL Cholesterol 62 (*)     Triglycerides 190 (*)     LDL Cholesterol 115 (*)     VLDL 33 (*)     Non HDL Chol 148 (*)     All other components within normal limits   TROPONIN I HIGH SENSITIVITY - Abnormal; Notable for the following components:    Troponin I (High Sensitivity) 2,470 (*)     All other components within normal limits   POCT ISTAT ACTIVATED CLOTTING TIME - Abnormal; Notable for the following components:    ISTAT Activated Clotting Time 282 (*)     All other components within normal limits   POCT ISTAT ACTIVATED CLOTTING TIME -  Abnormal; Notable for the following components:    ISTAT Activated Clotting Time 217 (*)     All other components within normal limits   CBC W/ DIFFERENTIAL - Abnormal; Notable for the following components:    RDW-SD 46.8 (*)     All other components within normal limits   TSH W REFLEX TO FREE T4 - Normal   PRO BETA NATRIURETIC PEPTIDE - Normal   PTT, ACTIVATED - Normal   CBC WITH DIFFERENTIAL WITH PLATELET    Narrative:     The following orders were created for panel order CBC With Differential With Platelet.  Procedure                               Abnormality         Status                     ---------                               -----------         ------                     CBC W/ DIFFERENTIAL[[id number]]          Abnormal            Final result                 Please view results for these tests on the individual orders.   PTT, ACTIVATED   RAINBOW DRAW LAVENDER   RAINBOW DRAW LIGHT GREEN   RAINBOW DRAW BLUE   RAINBOW DRAW GOLD     EKG    Rate, intervals and axes as noted on EKG Report.  Rate: 65  Rhythm: Sinus Rhythm  Reading: ST elevation 2 3 aVF with depression in V2 V3                          MDM      Pulse ox 99% on room air, normal  Admission disposition: 4/2/2024 10:14 PM                                        Medical Decision Making  Patient with acute ST elevation MI  Nitro heparin Lopressor and aspirin given in the ED patient taken directly to Cath Lab by [facility] cardiology  Discussed with hospitalist  Intensivist on consult    Problems Addressed:  Acute ST elevation myocardial infarction (STEMI), unspecified artery (HCC): acute illness or injury that poses a threat to life or bodily functions    Amount and/or Complexity of Data Reviewed  External Data Reviewed: radiology.     Details: CT calcium score in 2021 2.6  Labs: ordered.  Radiology: ordered and independent interpretation performed.     Details: No infiltrate other results per radiologist  ECG/medicine tests: ordered and independent  interpretation performed. Decision-making details documented in ED Course.  Discussion of management or test interpretation with external provider(s): Case discussed with hospitalist and [facility] cardiology    Risk  Decision regarding hospitalization.    Critical Care  Total time providing critical care: minutes (A total of 30 minutes of critical care time (exclusive of billable procedures) was administered to manage the patient's cardiovascular instability due to his acute MI.  This involved direct patient intervention, complex decision making, and/or extensive discussions with the patient, family, and clinical staff.  )        Disposition and Plan     Clinical Impression:  1. Acute ST elevation myocardial infarction (STEMI), unspecified artery (HCC)         Disposition:  Admit  4/2/2024 10:14 pm    Follow-up:  No follow-up provider specified.  We recommend that you schedule follow up care with a primary care provider within the next three months to obtain basic health screening including reassessment of your blood pressure.      Medications Prescribed:  Current Discharge Medication List                            Hospital Problems       Present on Admission  Date Reviewed: 7/20/2023            ICD-10-CM Noted POA    * (Principal) Acute ST elevation myocardial infarction (STEMI), unspecified artery (HCC) I21.3 4/2/2024 Unknown

## 2024-04-03 NOTE — PROGRESS NOTES
[facility]     Glenn Torres Patient Status:  Inpatient    1955 MRN [id number]   Location [facility] 2W/SW Attending Wing Martinez MD   Hosp Day # 0 PCP Jose Croft DO     Chief Complaint:   Chief Complaint   Patient presents with    Chest Pain        Subjective:     Patient seen sitting in bed.  Family at bedside.  Patient denies acute events overnight.  Patient reports feeling better today.  Denies current chest pain or shortness of breath.    Objective:      Vital signs:  Vitals:    24 0700 24 0800 24 0900 24 1000   BP: 129/87 130/80 122/87 117/72   BP Location: Left arm Left arm Right arm Right arm   Pulse: 59 71 101 94   Resp: 17 18 16 14   Temp:  97.7 °F (36.5 °C)     TempSrc:  Temporal     SpO2: 96% 95% 96% 96%   Weight:       Height:           Intake/Output Summary (Last 24 hours) at 4/3/2024 1123  Last data filed at 4/3/2024 0956  Gross per 24 hour   Intake 527.6 ml   Output 950 ml   Net -422.4 ml           Physical Exam:    GENERAL:  Awake and alert, in no acute distress.  HEART:  Regular rhythm, regular rate  LUNGS:  Air entry was minimally decreased.  No crackles or wheezes   ABDOMEN: Soft and non-tender.    PSYCHIATRIC: Normal mood    Diagnostic Data:    Labs:    Recent Labs   Lab 24  2210   WBC 7.1   HGB 15.5   MCV 97.9   .0       Recent Labs   Lab 24  2210   *   BUN 18   CREATSERUM 1.13   CA 10.0   ALB 4.7      K 4.0      CO2 28.0   ALKPHO 65   AST 47*   ALT 23   BILT 0.4   TP 8.2           Estimated Creatinine Clearance: 56.5 mL/min (based on SCr of 1.13 mg/dL).    No results for input(s): \"PTP\", \"INR\" in the last 168 hours.    Lab Results   Component Value Date    TSH 2.862 2024       COVID-19  Lab Results   Component Value Date    COVID19 Not Detected 2020       Pro-Calcitonin  No results for input(s): \"PCT\" in the last 168  hours.    Cardiac  No results for input(s): \"TROP\", \"PBNP\" in the last 168 hours.    Inflammatory Markers  No results for input(s): \"CRP\", \"ERIKA\", \"LDH\", \"DDIMER\" in the last 168 hours.    Culture:  No results found for this visit on 04/02/24.    No results found.    EKG 12 Lead    Result Date: 4/3/2024  Normal sinus rhythm ST & T wave abnormality, consider lateral ischemia Abnormal ECG When compared with ECG of 02-APR-2024 21:53, Vent. rate has increased BY  34 BPM ST no longer elevated in Inferior leads Non-specific change in ST segment in Lateral leads Nonspecific T wave abnormality now evident in Inferior leads T wave inversion now evident in Anterior-lateral leads    EKG 12 Lead    Result Date: 4/2/2024  Normal sinus rhythm with sinus arrhythmia ST elevation, consider early repolarization, pericarditis, or injury Nonspecific ST abnormality Abnormal ECG No previous ECGs found in [facility]     Medications:    [START ON 4/4/2024] aspirin  81 mg Oral Daily    atorvastatin  80 mg Oral Nightly    metoprolol tartrate  25 mg Oral 2x Daily(Beta Blocker)    ticagrelor  90 mg Oral Q12H       Assessment & Plan:        Acute ST elevation myocardial infarction (STEMI), unspecified artery (HCC)  -Patient presenting with Chest Pain  -Noted to have ST elevations on EKG.  -Initially started on heparin GTT  -Taken for cardiac catheterization.  Status post PCI of proximal circumflex on 4/2.  -Noted to have residual disease in the proximal to mid LAD.  Plan for repeat cardiac catheterization on 4/4.  -Telemetry monitoring.   -Heparin discontinued.  -Continue aspirin, ticagrelor, beta-blocker and statin.  -Cardiology consulted, appreciate further recommendations    HTN  - controlled  - CPM  - Monitor and adjust as needed    Hyperlipidemia  -Continue statin        Plan of care discussed with patient and family at bedside and with Rn. Discussed management/test result(s) with pulmonology consultant    Quality:  DVT Prophylaxis: Heparin  CODE  status: Full  Estimated date of discharge: TBD  Discharge is dependent on: clinical stability    Wing Martinez MD          This note was prepared using Dragon Medical voice recognition dictation software. As a result errors may occur. When identified these errors have been corrected. While every attempt is made to correct errors during dictation discrepancies may still exist

## 2024-04-03 NOTE — ED INITIAL ASSESSMENT (HPI)
Pt to ED with c/o mid sternal CP since yesterday. Pt describes the pain as burning sensation. Pt states CP resolved yesterday and come back today. Pain ongoing for about an hour now. Pt denies SOB, cough, n/v.

## 2024-04-03 NOTE — RESPIRATORY THERAPY NOTE
Pt states they have a hx/diagnosis of sleep apnea. Pt states they do not currently use CPAP at home. Pt refuses to use CPAP overnight during hospital stay.

## 2024-04-03 NOTE — PROCEDURES
[facility]    PATIENT'S NAME: FABI DUFFY   ATTENDING PHYSICIAN: Wing Martinez MD   OPERATING PHYSICIAN: Manolo Sanchez MD   PATIENT ACCOUNT#:   [id number]    LOCATION:  [address] [place] [address]  MEDICAL RECORD #:   [id number]       YOB: 1955  ADMISSION DATE:       04/02/2024      OPERATION DATE:  04/02/2024    CARDIAC PROCEDURE TRANSCRIPTION    CARDIAC CATHETERIZATION/PERCUTANEOUS CORONARY INTERVENTION     PREOPERATIVE DIAGNOSIS:    1.   Inferior wall myocardial infarction.    2.   Previous history of hyperlipidemia.  POSTOPERATIVE DIAGNOSIS:  Inferolateral myocardial infarction.   PROCEDURE PERFORMED:  Left heart catheterization, ventriculography, percutaneous transluminal coronary angioplasty with stent placement of the circumflex coronary artery, and intravascular ultrasound of the circumflex coronary artery.    SEDATION:  The patient received conscious sedation.  This was monitored by myself and the catheterization lab staff.  This started at 2305 and ended at 0039.    DESCRIPTION OF PROCEDURE:  After informed consent was obtained, patient was prepped and draped in usual sterile fashion.  Using ultrasound, right common femoral artery was identified and entered just above the bifurcation.  The patient had a high bifurcation.  A micropuncture technique was used.  A 6-Armenian sheath subsequently was placed.  Ewa right, left, and pigtail catheters were used for diagnostic procedure.  Please see details regarding angioplasty procedure below.  At the end of the case, a 6-Armenian Angio-Seal was used to seal the right common femoral artery site.  No apparent acute complications noted.  The patient tolerated the procedure well.    CORONARIES:  Injection of the left main coronary artery revealed no significant disease of the left main.  Left main trifurcates into the left anterior descending, circumflex, and ramus intermedius branches.  The left anterior descending artery has a  stenosis proximally about 50%.  This is eccentric and 50% to 70% just after the takeoff of a diagonal branch.  The rest of the LAD appears to be free of significant disease.  The second diagonal branch is free of significant disease.    There is a large ramus intermedius branch noted.  There is no significant disease noted in this branch.    The circumflex coronary artery is occluded in its proximal to mid section.    The right coronary artery is a large dominant vessel.  No significant obstructions are noted in the right coronary artery, its posterior descending or posterolateral branches.  No collaterals are noted to the circumflex.    DESCRIPTION OF ANGIOPLASTY:  After heparin was started to maintain ACT over 250 seconds, an EBU 3.5 mm guiding catheter was placed in the left main.  A 190 cm Runthrough wire was placed into posterolateral branch without difficulty.  The patient had return of CLINTON I flow with this.  The area of stenosis was ballooned with a 2.5 mm balloon.  With 2.5 mm balloon, CLINTON III flow was restored.  There was a high-grade stenosis of about 90% at the takeoff of AV branch of the circumflex.  There was very little runoff.  There was a much smaller or higher obtuse marginal branch.    Intravascular ultrasound interrogation was performed.  This revealed a lumen size of about 3 mm proximally and about 2.5 mm distally.  There appeared to be a clean area before the takeoff of the posterolateral branch and just at the level of the AV branch of the circumflex.  Therefore, I chose to put in a 2.5 x 16 drug-eluting stent from a normal area back into the proximal circumflex.  The posterolateral branch itself appeared to be of a much smaller caliber measuring between 2 and 2.5 mm.  There was mild nonobstructive disease.  Therefore, I chose not to stent this segment.    After the stent was placed, there appeared to be evidence of no reflow.  Intravascular ultrasound interrogation was performed.  This  revealed good placement of the stent both proximally and distally without evidence of dissection.  A 3.0 noncompliant balloon was used to perform high-pressure inflations throughout the stented segment.  I followed this up again with angiography which revealed again CLINTON I flow.  Aliquots of nitroglycerin and adenosine were given into the left main with minimal improvement.  Another interrogation with intravascular ultrasound revealed no significant residual stenosis.    Based on this, it was clear the patient was having no reflow phenomenon.  I felt majority of the contrast was going directly into the much larger ramus.  Therefore, using a Mehran blue wire, I placed a Corsair catheter into the main circumflex and again gave multiple aliquots of adenosine proximally along with nitroglycerin as well as beyond the stented segment into the posterolateral branch.  Injection of contrast within the stented segment revealed essentially normal flow.  There was refluxing of the contrast, but contrast appeared to clear quickly.  I waited another 10 to 20 minutes observing the vessel.  There was return of close to CLINTON III flow at the end.  The runoff vessels were very small with perhaps embolization of thrombus.  However, I did not see any cut-offs in the terminal vessels.  Given that the patient was hemodynamically stable without chest discomfort, I chose to terminate the procedure.  As noted above, intravascular ultrasound interrogation was again performed before I terminated.  This revealed excellent apposition without evidence of any distal dissection.  The proximal was slightly under deployed and could have used a 3.5 balloon.  However, I was concerned that I would restart this process of no reflow.  This observation is based on the fact that the stent was sized in the disease in the vessel without evidence of dissection.  Further optimization could be done; however, my concern was that we would potentially restart his no  reflow phenomenon.  Therefore, I chose not to do it.  I chose to finish the procedure for tonight.    VENTRICULOGRAPHY:  Ventriculography reveals normal left ventricular systolic function with an estimated ejection fraction of 60%.  There is very mild mid inferior wall hypokinesis.  Left ventricular end-diastolic pressure was about 12 mmHg.  No significant gradient was noted upon pullback across aortic valve.    SUMMARY:  In summary, the patient was demonstrated to have an occlusion of a smaller circumflex coronary artery.  He underwent PCI of this which led to no reflow phenomenon which slowly resolved with intracoronary adenosine and nitroglycerin as described above.     Dictated By Manolo Sanchez MD  d: 04/03/2024 00:49:44  t: 04/03/2024 01:59:46  [facility] [id number]  [facility]/    cc: Wing Martinez MD

## 2024-04-03 NOTE — ED INITIAL ASSESSMENT (HPI)
Pt to room with wife.   Cardiac alert called by ELÍAS Hadley.  PT reporting CP x 1 day, 6/10, burning.

## 2024-04-03 NOTE — PROGRESS NOTES
[facility] - CARDIOLOGY PROGRESS NOTE  Glenn Torres Patient Status:  Inpatient    1955 MRN [id number]   Location [facility] 2W/SW Attending Wing Martinez MD   Hosp Day # 0 PCP Jose Croft,      Assessment:    1.  CAD.  Presentation last evening with a acute inferoposterior wall STEMI.  Status post PCI of proximal circumflex.  Initial slow reflow, improved by the end of the case.  Patient pain-free now.    2.  Residual disease in proximal to mid LAD.      Plan:    Echocardiogram today to assess LV systolic function.    Discontinue cangrelor, begin oral Brilinta.    Discontinue heparin.    Begin metoprolol.    High-dose statin.    Return to lab tomorrow for staged intervention on LAD.      Subjective:  No chest pain or shortness of breath.    Objective:  /87 (BP Location: Left arm)   Pulse 59   Temp 97.8 °F (36.6 °C) (Temporal)   Resp 17   Ht 167.6 cm (5' 6\")   Wt 167 lb 15.9 oz (76.2 kg)   SpO2 96%   BMI 27.11 kg/m²     Temp (24hrs), Av.6 °F (36.4 °C), Min:97.5 °F (36.4 °C), Max:97.8 °F (36.6 °C)      Intake/Output:    Intake/Output Summary (Last 24 hours) at 4/3/2024 0917  Last data filed at 4/3/2024 0600  Gross per 24 hour   Intake 445.4 ml   Output 950 ml   Net -504.6 ml       Wt Readings from Last 2 Encounters:   24 167 lb 15.9 oz (76.2 kg)   22 180 lb (81.6 kg)       Physical Exam:    General: Alert and oriented x 3,  No apparent distress.  HEENT: No focal deficits.  Neck: No JVD, carotids 2+ no bruits.  Cardiac: Regular rate and rhythm, S1, S2 normal, no murmur  Lungs: Clear without wheezes, rales, rhonchi.  Normal excursions and effort.  Abdomen: Soft, non-tender.   Extremities: Without clubbing, cyanosis or edema.  Peripheral pulses are 2+.  Right groin soft, nontender.  Small ecchymosis.  No bruit.  Skin: Warm and dry.     Labs:  Lab Results    Component Value Date    WBC 7.1 04/02/2024    HGB 15.5 04/02/2024    HCT 46.9 04/02/2024    .0 04/02/2024     No results found for: \"PT\", \"INR\"  Lab Results   Component Value Date     04/02/2024    K 4.0 04/02/2024     04/02/2024    CO2 28.0 04/02/2024    BUN 18 04/02/2024    CREATSERUM 1.13 04/02/2024     04/02/2024    CA 10.0 04/02/2024    ALT 23 04/02/2024    AST 47 04/02/2024    ALB 4.7 04/02/2024     Lab Results   Component Value Date     04/02/2024    HDL 62 04/02/2024    TRIG 190 04/02/2024    VLDL 33 04/02/2024     No results found for: \"TROP\", \"CKMB\"     Medications:     [START ON 4/4/2024] aspirin  81 mg Oral Daily    atorvastatin  80 mg Oral Nightly    metoprolol tartrate  25 mg Oral 2x Daily(Beta Blocker)    ticagrelor  180 mg Oral Once    ticagrelor  90 mg Oral Q12H         Elia Ferguson MD  4/3/2024  9:17 AM

## 2024-04-03 NOTE — H&P
[facility]  part of [facility]    History & Physical    Glenn Torres Patient Status:  Emergency    1955 MRN [id number]   Location [facility] EMERGENCY DEPARTMENT Attending Jaja Jacobson MD   Hosp Day # 0 PCP Jose Crotf DO     Date:  2024  Date of Admission:  2024    Chief Complaint:  Chief Complaint   Patient presents with    Chest Pain       History of Present Illness:  Glenn Torres is a(n) 68 year old male, with a past medical history significant for hyperlipidemia presents with complaint of burning chest pain ongoing for the last 2 days.  Describes the onset as sudden intermittent nature with no clear aggravating or relieving factors however prior to arrival to the ER pain much worse, more constant 6 out of 10 at its worst, nonradiating associated with diaphoresis and nausea.  Denies any emesis or shortness of breath.  EKG done in ER shows evidence of ST elevations in inferior and lateral leads along with elevated troponin    History:  Past Medical History:   Diagnosis Date    Back pain 2007    Hyperlipidemia      Past Surgical History:   Procedure Laterality Date    ARTHROSCOPY, SHOULDER, SURGI Right 2008, 2009,     COLONOSCOPY  2020    COLONOSCOPY N/A 2020    Procedure: COLONOSCOPY, POSSIBLE BIOPSY, POSSIBLE POLYPECTOMY 21717;  Surgeon: Rivas Ledesma MD;  Location: [facility], [facility]     Family History   Problem Relation Age of Onset    Heart Disorder Mother 83        Heart Attack    Other (Tumor) Father 75        Brain Tumor    Heart Disorder Sister 65        [place] actual Dx name    Heart Disorder Brother 65        Quartupal Bypas    Stroke Brother 62    Diabetes Neg     Glaucoma Neg     Macular degeneration Neg       reports that he has never smoked. He has never used smokeless tobacco. He reports current alcohol use of about 1.0 standard drink of alcohol per week. He reports that he does not use  drugs.    Allergies:  No Known Allergies    Home Medications:  Prior to Admission Medications   Prescriptions Last Dose Informant Patient Reported? Taking?   B Complex Oral Tab   Yes No   Si tablet daily   Garlic-DHA-EPA (GARLIC/EPA OR)   Yes No   Sig: garlic   Multiple Vitamin (MULTIVITAMIN TAB/CAP)   Yes No   Sig: Take by mouth daily.     ergocalciferol 1.25 MG (20250 UT) Oral Cap   No No   Sig: Take 1 capsule (50,000 Units total) by mouth once a week.      Facility-Administered Medications: None       Review of Systems:  Constitutional:  Weakness, Fatigue.  Eye:  Negative.  Ear/Nose/Mouth/Throat:  Negative.  Respiratory:  Negative  Cardiovascular: Chest pain  Gastrointestinal:  Negative.  Genitourinary:  Negative  Endocrine:  Negative.  Immunologic:  Negative.  Musculoskeletal:  Negative.  Integumentary:  Negative.  Neurologic:  Negative.  Psychiatric:  Negative.  ROS reviewed as documented in chart    Physical Exam:  Temp:  [97.5 °F (36.4 °C)] 97.5 °F (36.4 °C)  Pulse:  [66-78] 69  Resp:  [10-20] 17  BP: (153-176)/() 168/100  SpO2:  [98 %-100 %] 99 %    General:  Alert and oriented.  Diffuse skin problem:  None.  Eye:  Pupils are equal, round and reactive to light, extraocular movements are intact, Normal conjunctiva.  HENT:  Normocephalic, oral mucosa is moist.  Head:  Normocephalic, atraumatic.  Neck:  Supple, non-tender, no carotid bruit, no jugular venous distention, no lymphadenopathy, no thyromegaly.  Respiratory:  Lungs are clear to auscultation, respirations are non-labored, breath sounds are equal, symmetrical chest wall expansion.  Cardiovascular:  Normal rate, regular rhythm, no murmur, no edema.  Gastrointestinal:  Soft, non-tender, non-distended, normal bowel sounds, no organomegaly.  Lymphatics:  No lymphadenopathy neck, axilla, groin.  Musculoskeletal: Normal range of motion.  normal strength.  Feet:  Normal pulses.  Neurologic:  Alert, oriented, no focal deficits, cranial nerves II-XII  are grossly intact.  Cognition and Speech:  Oriented, speech clear and coherent.  Psychiatric:  Cooperative, appropriate mood & affect.      Laboratory Data:   Lab Results   Component Value Date    WBC 7.1 04/02/2024    HGB 15.5 04/02/2024    HCT 46.9 04/02/2024    .0 04/02/2024       Imaging:  No results found.     Assessment and Plan:    STEMI  Patient given nitro, Lopressor started on heparin drip, cardiology on board, taken to Cath Lab    Essential hypertension with hypertensive heart disease  Blood pressure poorly controlled, will start patient on Lopressor, use IV hydralazine/labetalol as needed for uncontrolled pressures, if remains elevated initiate nitro drip.    Hyperlipidemia  Start statin    Prophylaxis  Intravenous heparin drip    CODE STATUS  Full    Primary care physician  Jose Croft,     MDM: High, severe exacerbation of chronic illness posing threat to life.  IV medications requiring close inpatient monitoring  75 minutes spent on this admission - examining patient, obtaining history, reviewing previous medical records, going over test results/imaging and discussing plan of care. All questions answered.     Disposition  Clinical course will dictate outcome      JANET MARAVILLA MD  4/2/2024  10:45 PM  STEMI  Patient was started on heparin drip, cardiology consulted for emergent cardiac cath

## 2024-04-03 NOTE — CONSULTS
[facility]  part of [facility]    Report of Consultation    Glenn Torres Patient Status:  Inpatient    1955 MRN [id number]   Location [facility] 2W/SW Attending Wing Martniez MD   Hosp Day # 0 PCP Jose Croft DO     Date of Admission:  2024    Reason for Consultation:   ST elevation myocardial infarction    History of Present Illness:   Patient is a 68-year-old male who presented with chief complaint of chest discomfort approximately 3 hours prior to arrival to hospital.  EKG in the emergency department concerning for STEMI.  Status post PCI of proximal circumflex.  Currently resting in bed.  Denies prior history of known coronary disease.  Denies any active chest pain or dyspnea at this time.  Hemodynamically stable.    Past Medical History  Past Medical History:   Diagnosis Date    Back pain 2007    Hyperlipidemia        Past Surgical History  Past Surgical History:   Procedure Laterality Date    ARTHROSCOPY, SHOULDER, SURGI Right 2008, 2009,     COLONOSCOPY  2020    COLONOSCOPY N/A 2020    Procedure: COLONOSCOPY, POSSIBLE BIOPSY, POSSIBLE POLYPECTOMY 97317;  Surgeon: Rivas Ledesma MD;  Location: [facility], [facility]       Family History  Family History   Problem Relation Age of Onset    Heart Disorder Mother 83        Heart Attack    Other (Tumor) Father 75        Brain Tumor    Heart Disorder Sister 65        [place] actual Dx name    Heart Disorder Brother 65        Quartupal Bypas    Stroke Brother 62    Diabetes Neg     Glaucoma Neg     Macular degeneration Neg        Social History  Social History     Socioeconomic History    Marital status:    Tobacco Use    Smoking status: Never    Smokeless tobacco: Never   Vaping Use    Vaping Use: Never used   Substance and Sexual Activity    Alcohol use: Yes     Alcohol/week: 1.0 standard drink of alcohol     Types: 1 Cans of beer per week     Comment: social     Drug use: Never           Current Medications:  Current Facility-Administered Medications   Medication Dose Route Frequency    [START ON 4/4/2024] aspirin DR tab 81 mg  81 mg Oral Daily    atorvastatin (Lipitor) tab 80 mg  80 mg Oral Nightly    HYDROmorphone (Dilaudid) 1 MG/ML injection 0.5 mg  0.5 mg Intravenous Q2H PRN    HYDROcodone-acetaminophen (Norco) 5-325 MG per tab 1 tablet  1 tablet Oral Q4H PRN    acetaminophen (Tylenol Extra Strength) tab 1,000 mg  1,000 mg Oral Q6H PRN    metoprolol tartrate (Lopressor) tab 25 mg  25 mg Oral 2x Daily(Beta Blocker)    ticagrelor (Brilinta) tab 90 mg  90 mg Oral Q12H    heparin (Porcine) 5000 UNIT/ML injection 5,000 Units  5,000 Units Subcutaneous 2 times per day     Medications Prior to Admission   Medication Sig    ergocalciferol 1.25 MG (79154 UT) Oral Cap Take 1 capsule (50,000 Units total) by mouth once a week.    Multiple Vitamin (MULTIVITAMIN TAB/CAP) Take by mouth daily.      B Complex Oral Tab 1 tablet daily    Garlic-DHA-EPA (GARLIC/EPA OR) garlic       Allergies  No Known Allergies    Review of Systems:   Constitutional: denies fevers, chills, weakness, fatigue, recent illness  HEENT: denies headache, sore throat, vision loss  Cardio: denies chest pain, chest pressure, palpitations  Respiratory: denies dyspnea, cough, wheezing, hemoptysis   GI: denies nausea, vomiting, abdominal pain  : denies dysuria, hematuria  Musculoskeletal: denies arthralgia, myalgia  Integumentary: denies rash, itching  Neurological: denies syncope, weakness, dizziness,   Psychiatric: denies depression, anxiety  Hematologic: denies bruising        Physical Exam:   Blood pressure 106/63, pulse 83, temperature 97.1 °F (36.2 °C), temperature source Temporal, resp. rate 20, height 5' 6\" (1.676 m), weight 167 lb 15.9 oz (76.2 kg), SpO2 95%.    Constitutional: no acute distress  Eyes: PERRL  ENT: nares patent  Neck: neck supple, no JVD  Cardio: RRR, S1 S2  Respiratory: clear to auscultation  bilaterally, no wheezing, rales, rhonchi, crackles  GI: abdomen soft, non tender, active bowel souds, no organomegaly  Extremities: no clubbing, cyanosis, edema  Neurologic: no gross motor deficits  Skin: warm, dry    Results:   Laboratory Data  Lab Results   Component Value Date    WBC 7.1 04/02/2024    HGB 15.5 04/02/2024    HCT 46.9 04/02/2024    .0 04/02/2024    CREATSERUM 1.13 04/02/2024    BUN 18 04/02/2024     04/02/2024    K 4.0 04/02/2024     04/02/2024    CO2 28.0 04/02/2024     (H) 04/02/2024    CA 10.0 04/02/2024    ALB 4.7 04/02/2024    ALKPHO 65 04/02/2024    TP 8.2 04/02/2024    AST 47 (H) 04/02/2024    ALT 23 04/02/2024    .4 (H) 04/03/2024    TSH 2.862 04/02/2024         Imaging  XR CHEST AP PORTABLE  (CPT=71045)    Result Date: 4/3/2024  CONCLUSION: Prominent pulmonary arteries suggesting pulmonary artery hypertension.  No definite acute cardiopulmonary abnormality.   [facility] provided a preliminary report for this examination. This final report agrees with their preliminary findings.   Dictated by (CST): Bogdan Butterfield MD on 4/03/2024 at 12:20 PM     Finalized by (CST): Bogdan Butterfield MD on 4/03/2024 at 12:22 PM           Assessment   1.  ST elevation myocardial infarction  2.  Coronary artery disease  3.  Hyperlipidemia    Plan   -Patient presented with evidence of chest pain.  Found to have ST elevation myocardial infarction.  Status post PCI of proximal circumflex.  Residual disease in proximal to mid LAD.  -Eval by cardiology.  Echo pending for today  -Cangrelor and heparin discontinued started on Brilinta.  -Discussed with cardiologist plan for intervention to LAD tomorrow  -Reviewed vitals, labs and imaging    Carlito Monterroso DO  Pulmonary Critical Care Medicine  [facility]  4/3/2024  1:25 PM

## 2024-04-04 ENCOUNTER — APPOINTMENT (OUTPATIENT)
Dept: INTERVENTIONAL RADIOLOGY/VASCULAR | Facility: HOSPITAL | Age: 69
End: 2024-04-04
Attending: INTERNAL MEDICINE
Payer: MEDICARE

## 2024-04-04 LAB
ANION GAP SERPL CALC-SCNC: 6 MMOL/L (ref 0–18)
BUN BLD-MCNC: 14 MG/DL (ref 9–23)
BUN/CREAT SERPL: 14.1 (ref 10–20)
CALCIUM BLD-MCNC: 9.1 MG/DL (ref 8.7–10.4)
CHLORIDE SERPL-SCNC: 113 MMOL/L (ref 98–112)
CO2 SERPL-SCNC: 24 MMOL/L (ref 21–32)
CREAT BLD-MCNC: 0.99 MG/DL
DEPRECATED RDW RBC AUTO: 48.4 FL (ref 35.1–46.3)
EGFRCR SERPLBLD CKD-EPI 2021: 83 ML/MIN/1.73M2 (ref 60–?)
ERYTHROCYTE [DISTWIDTH] IN BLOOD BY AUTOMATED COUNT: 13.3 % (ref 11–15)
GLUCOSE BLD-MCNC: 99 MG/DL (ref 70–99)
HCT VFR BLD AUTO: 37.2 %
HGB BLD-MCNC: 12.3 G/DL
ISTAT ACTIVATED CLOTTING TIME: 244 SECONDS (ref 125–137)
MCH RBC QN AUTO: 32.8 PG (ref 26–34)
MCHC RBC AUTO-ENTMCNC: 33.1 G/DL (ref 31–37)
MCV RBC AUTO: 99.2 FL
OSMOLALITY SERPL CALC.SUM OF ELEC: 297 MOSM/KG (ref 275–295)
PLATELET # BLD AUTO: 173 10(3)UL (ref 150–450)
POTASSIUM SERPL-SCNC: 4.2 MMOL/L (ref 3.5–5.1)
RBC # BLD AUTO: 3.75 X10(6)UL
SODIUM SERPL-SCNC: 143 MMOL/L (ref 136–145)
WBC # BLD AUTO: 8.8 X10(3) UL (ref 4–11)

## 2024-04-04 PROCEDURE — B2111ZZ FLUOROSCOPY OF MULTIPLE CORONARY ARTERIES USING LOW OSMOLAR CONTRAST: ICD-10-PCS | Performed by: INTERNAL MEDICINE

## 2024-04-04 PROCEDURE — 027034Z DILATION OF CORONARY ARTERY, ONE ARTERY WITH DRUG-ELUTING INTRALUMINAL DEVICE, PERCUTANEOUS APPROACH: ICD-10-PCS | Performed by: INTERNAL MEDICINE

## 2024-04-04 PROCEDURE — 02703ZZ DILATION OF CORONARY ARTERY, ONE ARTERY, PERCUTANEOUS APPROACH: ICD-10-PCS | Performed by: INTERNAL MEDICINE

## 2024-04-04 PROCEDURE — 99232 SBSQ HOSP IP/OBS MODERATE 35: CPT | Performed by: INTERNAL MEDICINE

## 2024-04-04 PROCEDURE — 99233 SBSQ HOSP IP/OBS HIGH 50: CPT | Performed by: INTERNAL MEDICINE

## 2024-04-04 RX ORDER — HEPARIN SODIUM 1000 [USP'U]/ML
INJECTION, SOLUTION INTRAVENOUS; SUBCUTANEOUS
Status: COMPLETED
Start: 2024-04-04 | End: 2024-04-04

## 2024-04-04 RX ORDER — VERAPAMIL HYDROCHLORIDE 2.5 MG/ML
INJECTION, SOLUTION INTRAVENOUS
Status: COMPLETED
Start: 2024-04-04 | End: 2024-04-04

## 2024-04-04 RX ORDER — MIDAZOLAM HYDROCHLORIDE 1 MG/ML
INJECTION INTRAMUSCULAR; INTRAVENOUS
Status: COMPLETED
Start: 2024-04-04 | End: 2024-04-04

## 2024-04-04 RX ORDER — LIDOCAINE HYDROCHLORIDE 20 MG/ML
INJECTION, SOLUTION EPIDURAL; INFILTRATION; INTRACAUDAL; PERINEURAL
Status: COMPLETED
Start: 2024-04-04 | End: 2024-04-04

## 2024-04-04 RX ORDER — SODIUM CHLORIDE 9 MG/ML
INJECTION, SOLUTION INTRAVENOUS CONTINUOUS
Status: ACTIVE | OUTPATIENT
Start: 2024-04-04 | End: 2024-04-04

## 2024-04-04 RX ORDER — NITROGLYCERIN 20 MG/100ML
INJECTION INTRAVENOUS
Status: COMPLETED
Start: 2024-04-04 | End: 2024-04-04

## 2024-04-04 RX ORDER — ASPIRIN 81 MG/1
81 TABLET ORAL DAILY
Status: DISCONTINUED | OUTPATIENT
Start: 2024-04-05 | End: 2024-04-04

## 2024-04-04 NOTE — PROGRESS NOTES
[facility]  part of [facility]ist Progress Note     Glenn Torres Patient Status:  Inpatient    1955 MRN [id number]   Location [facility] 2W/SW Attending Wing Martinez MD   Hosp Day # 1 PCP Jose Croft DO     Chief Complaint:   Chief Complaint   Patient presents with    Chest Pain        Subjective:     Patient seen lying in bed.  No family at bedside.  Patient denies acute events overnight.  Patient reports feeling well today.  Patient remains without further chest pain.    Objective:      Vital signs:  Vitals:    24 0400 24 0600 24 0800 24 1000   BP: 109/73 100/75 94/60 95/66   BP Location: Right arm Right arm Right arm Right arm   Pulse: 63 62 60 61   Resp: 18 18 21 19   Temp: 96.8 °F (36 °C)  99 °F (37.2 °C)    TempSrc: Temporal  Temporal    SpO2: 98% 95% 96% 94%   Weight:  159 lb 9.8 oz (72.4 kg)     Height:           Intake/Output Summary (Last 24 hours) at 2024 1205  Last data filed at 4/3/2024 1848  Gross per 24 hour   Intake --   Output 1750 ml   Net -1750 ml           Physical Exam:    GENERAL:  Awake and alert, in no acute distress.  HEART:  Regular rhythm, regular rate  LUNGS:  Air entry was minimally decreased.  No crackles or wheezes   ABDOMEN: Soft and non-tender.    PSYCHIATRIC: Normal mood    Diagnostic Data:    Labs:    Recent Labs   Lab 24  0345   WBC 7.1 8.8   HGB 15.5 12.3*   MCV 97.9 99.2   .0 173.0       Recent Labs   Lab 24  0345   * 99   BUN 18 14   CREATSERUM 1.13 0.99   CA 10.0 9.1   ALB 4.7  --     143   K 4.0 4.2    113*   CO2 28.0 24.0   ALKPHO 65  --    AST 47*  --    ALT 23  --    BILT 0.4  --    TP 8.2  --            Estimated Creatinine Clearance: 64.4 mL/min (based on SCr of 0.99 mg/dL).    No results for input(s): \"PTP\", \"INR\" in the last 168 hours.           COVID-19  Lab Results   Component Value Date    COVID19 Not  Detected 09/18/2020       Pro-Calcitonin  No results for input(s): \"PCT\" in the last 168 hours.    Cardiac  No results for input(s): \"TROP\", \"PBNP\" in the last 168 hours.    Inflammatory Markers  No results for input(s): \"CRP\", \"ERIKA\", \"LDH\", \"DDIMER\" in the last 168 hours.    Culture:  No results found for this visit on 04/02/24.    XR CHEST AP PORTABLE  (CPT=71045)    Result Date: 4/3/2024  CONCLUSION: Prominent pulmonary arteries suggesting pulmonary artery hypertension.  No definite acute cardiopulmonary abnormality.   [facility] provided a preliminary report for this examination. This final report agrees with their preliminary findings.   Dictated by (CST): Bogdan Butterfield MD on 4/03/2024 at 12:20 PM     Finalized by (CST): Bogdan Butterfield MD on 4/03/2024 at 12:22 PM           EKG 12 Lead    Result Date: 4/3/2024  Normal sinus rhythm ST & T wave abnormality, consider lateral ischemia Abnormal ECG When compared with ECG of 02-APR-2024 21:53, Vent. rate has increased BY  34 BPM ST no longer elevated in Inferior leads Non-specific change in ST segment in Lateral leads Nonspecific T wave abnormality now evident in Inferior leads T wave inversion now evident in Anterior-lateral leads Confirmed by DOREEN WEBBER MICHAEL ([id number]) on 4/3/2024 4:07:42 PM    EKG 12 Lead    Result Date: 4/3/2024  Normal sinus rhythm with sinus arrhythmia ST elevation, consider early repolarization, pericarditis, or injury Nonspecific ST abnormality Abnormal ECG No previous ECGs found in [facility] Confirmed by DOREEN WEBBER MICHAEL ([id number]) on 4/3/2024 3:34:21 PM     Medications:    aspirin  81 mg Oral Daily    atorvastatin  80 mg Oral Nightly    metoprolol tartrate  25 mg Oral 2x Daily(Beta Blocker)    ticagrelor  90 mg Oral Q12H    heparin  5,000 Units Subcutaneous 2 times per day       Assessment & Plan:        Acute ST elevation myocardial infarction (STEMI), unspecified artery (HCC)  -Patient presenting with Chest Pain  -Noted to have ST  elevations on EKG.  -Initially started on heparin GTT, discontinued post cardiac catheterization  - Status post PCI of proximal circumflex on 4/2.  -Noted to have residual disease in the proximal to mid LAD.  Plan for repeat cardiac catheterization on 4/4.  -Telemetry monitoring.   -Continue aspirin, ticagrelor, beta-blocker and statin.  -Cardiology consulted, appreciate further recommendations    HTN  - controlled  - CPM  - Monitor and adjust as needed    Hyperlipidemia  -Continue statin        Plan of care discussed with patient at bedside . Discussed management/test result(s) with RN    Quality:  DVT Prophylaxis: Heparin  CODE status: Full  Estimated date of discharge: TBD  Discharge is dependent on: clinical stability    52 minutes spent discussing with other providers, examining patient, obtaining history, reviewing medical records, interpreting and communicating test results/imaging, ordering tests/medications, discussing plan of care and documenting information.      Wing Martinez MD          This note was prepared using Dragon Medical voice recognition dictation software. As a result errors may occur. When identified these errors have been corrected. While every attempt is made to correct errors during dictation discrepancies may still exist

## 2024-04-04 NOTE — PROGRESS NOTES
[facility]  part of [facility]     Progress Note    Glenn Torres Patient Status:  Inpatient    1955 MRN [id number]   Location [facility] 2W/SW Attending Wing Martinez MD   Hosp Day # 1 PCP Jose Croft,        Subjective:   Patient seen and examined.  Resting in bed.  Denies significant dyspnea or chest pain    Objective:   Blood pressure 100/75, pulse 62, temperature 96.8 °F (36 °C), temperature source Temporal, resp. rate 18, height 5' 6\" (1.676 m), weight 159 lb 9.8 oz (72.4 kg), SpO2 95%.  Intake/Output:   Last 3 shifts: I/O last 3 completed shifts:  In: 527.6 [I.V.:527.6]  Out: 2700 [Urine:2700]   This shift: No intake/output data recorded.     Vent Settings:      Hemodynamic parameters (last 24 hours):      Scheduled Meds:   Current Facility-Administered Medications   Medication Dose Route Frequency    aspirin DR tab 81 mg  81 mg Oral Daily    atorvastatin (Lipitor) tab 80 mg  80 mg Oral Nightly    HYDROmorphone (Dilaudid) 1 MG/ML injection 0.5 mg  0.5 mg Intravenous Q2H PRN    HYDROcodone-acetaminophen (Norco) 5-325 MG per tab 1 tablet  1 tablet Oral Q4H PRN    acetaminophen (Tylenol Extra Strength) tab 1,000 mg  1,000 mg Oral Q6H PRN    metoprolol tartrate (Lopressor) tab 25 mg  25 mg Oral 2x Daily(Beta Blocker)    ticagrelor (Brilinta) tab 90 mg  90 mg Oral Q12H    heparin (Porcine) 5000 UNIT/ML injection 5,000 Units  5,000 Units Subcutaneous 2 times per day       Continuous Infusions:     Physical Exam  Constitutional: no acute distress  Eyes: PERRL  ENT: nares pateint  Neck: supple, no JVD  Cardio: RRR, S1 S2  Respiratory: clear to auscultation bilaterally, no wheezing, rales, rhonchi, crackles  GI: abdomen soft, non tender, active bowel sounds, no organomegaly  Extremities: no clubbing, cyanosis, edema  Neurologic: no gross motor deficits  Skin: warm, dry      Results:     Lab Results   Component Value Date    WBC 8.8 2024    HGB 12.3 2024     HCT 37.2 04/04/2024    .0 04/04/2024    CREATSERUM 0.99 04/04/2024    BUN 14 04/04/2024     04/04/2024    K 4.2 04/04/2024     04/04/2024    CO2 24.0 04/04/2024    GLU 99 04/04/2024    CA 9.1 04/04/2024       XR CHEST AP PORTABLE  (CPT=71045)    Result Date: 4/3/2024  CONCLUSION: Prominent pulmonary arteries suggesting pulmonary artery hypertension.  No definite acute cardiopulmonary abnormality.   [facility] provided a preliminary report for this examination. This final report agrees with their preliminary findings.   Dictated by (CST): Bogdan Butterfield MD on 4/03/2024 at 12:20 PM     Finalized by (CST): Bogdan Butterfield MD on 4/03/2024 at 12:22 PM           EKG 12 Lead    Result Date: 4/3/2024  Normal sinus rhythm ST & T wave abnormality, consider lateral ischemia Abnormal ECG When compared with ECG of 02-APR-2024 21:53, Vent. rate has increased BY  34 BPM ST no longer elevated in Inferior leads Non-specific change in ST segment in Lateral leads Nonspecific T wave abnormality now evident in Inferior leads T wave inversion now evident in Anterior-lateral leads Confirmed by DOREEN WEBBER MICHAEL ([id number]) on 4/3/2024 4:07:42 PM    EKG 12 Lead    Result Date: 4/3/2024  Normal sinus rhythm with sinus arrhythmia ST elevation, consider early repolarization, pericarditis, or injury Nonspecific ST abnormality Abnormal ECG No previous ECGs found in [facility] Confirmed by DOREEN WEBBER MICHAEL ([id number]) on 4/3/2024 3:34:21 PM     Assessment   1.  ST elevation myocardial infarction  2.  Coronary artery disease  3.  Hyperlipidemia     Plan   -Patient presented with evidence of chest pain.  Found to have ST elevation myocardial infarction.  Status post PCI of proximal circumflex.  Residual disease in proximal to mid LAD.  -Eval by cardiology.  2D echo with intact ejection fraction no significant regional wall motion abnormality seen.  -Cangrelor and heparin discontinued started on Brilinta.  -Discussed with  cardiologist plan for intervention to LAD later today    Carlito Monterroso, DO  Pulmonary Critical Care Medicine  [facility]

## 2024-04-04 NOTE — PLAN OF CARE
Heparin gtt & Cangrelor gtt discontinued, Brilinta started. To cath lab tomorrow for staged intervention on LAD. Maintained patient safety.   Problem: Patient Centered Care  Goal: Patient preferences are identified and integrated in the patient's plan of care  Description: Interventions:  - What would you like us to know as we care for you?   - Provide timely, complete, and accurate information to patient/family  - Incorporate patient and family knowledge, values, beliefs, and cultural backgrounds into the planning and delivery of care  - Encourage patient/family to participate in care and decision-making at the level they choose  - Honor patient and family perspectives and choices  Outcome: Progressing     Problem: Patient/Family Goals  Goal: Patient/Family Long Term Goal  Description: Patient's Long Term Goal:     Interventions:  -   - See additional Care Plan goals for specific interventions  Outcome: Progressing  Goal: Patient/Family Short Term Goal  Description: Patient's Short Term Goal:     Interventions:   -   - See additional Care Plan goals for specific interventions  Outcome: Progressing     Problem: SAFETY ADULT - FALL  Goal: Free from fall injury  Description: INTERVENTIONS:  - Assess pt frequently for physical needs  - Identify cognitive and physical deficits and behaviors that affect risk of falls.  - [place] fall precautions as indicated by assessment.  - Educate pt/family on patient safety including physical limitations  - Instruct pt to call for assistance with activity based on assessment  - Modify environment to reduce risk of injury  - Provide assistive devices as appropriate  - Consider OT/PT consult to assist with strengthening/mobility  - Encourage toileting schedule  Outcome: Progressing     Problem: CARDIOVASCULAR - ADULT  Goal: Maintains optimal cardiac output and hemodynamic stability  Description: INTERVENTIONS:  - Monitor vital signs, rhythm, and trends  - Monitor for bleeding,  hypotension and signs of decreased cardiac output  - Evaluate effectiveness of vasoactive medications to optimize hemodynamic stability  - Monitor arterial and/or venous puncture sites for bleeding and/or hematoma  - Assess quality of pulses, skin color and temperature  - Assess for signs of decreased coronary artery perfusion - ex. Angina  - Evaluate fluid balance, assess for edema, trend weights  Outcome: Progressing  Goal: Absence of cardiac arrhythmias or at baseline  Description: INTERVENTIONS:  - Continuous cardiac monitoring, monitor vital signs, obtain 12 lead EKG if indicated  - Evaluate effectiveness of antiarrhythmic and heart rate control medications as ordered  - Initiate emergency measures for life threatening arrhythmias  - Monitor electrolytes and administer replacement therapy as ordered  Outcome: Progressing     Problem: PAIN - ADULT  Goal: Verbalizes/displays adequate comfort level or patient's stated pain goal  Description: INTERVENTIONS:  - Encourage pt to monitor pain and request assistance  - Assess pain using appropriate pain scale  - Administer analgesics based on type and severity of pain and evaluate response  - Implement non-pharmacological measures as appropriate and evaluate response  - Consider cultural and social influences on pain and pain management  - Manage/alleviate anxiety  - Utilize distraction and/or relaxation techniques  - Monitor for opioid side effects  - Notify MD/LIP if interventions unsuccessful or patient reports new pain  - Anticipate increased pain with activity and pre-medicate as appropriate  Outcome: Progressing

## 2024-04-04 NOTE — CARDIAC REHAB
Cardiac Rehab Phase I    Activity:  Distance n/a  Assistance needed   Patient tolerated activity     Education:  Handouts provided and reviewed: [facility] Booklet.       Diet: Healthy Cardiac diet reviewed.    Disease Process: Disease process reviewed.    Reviewed the following: SITE CARE: reviewed       RISK FACTORS: reviewed      SMOKING CESSATION: reviewed      HOME EXERCISE ACTIVITY: reviewed      OUTPATIENT CARDIAC REHAB: reviewed

## 2024-04-04 NOTE — PLAN OF CARE
Staged PCI s/p MI. Rt radial access, stent to LAD w/ lesion plasty. Pre-intervention LAD stenosis: 80%, post-intervention LAD stenosis: 0%. Maintained patient safety.   Problem: Patient Centered Care  Goal: Patient preferences are identified and integrated in the patient's plan of care  Description: Interventions:  - What would you like us to know as we care for you?   - Provide timely, complete, and accurate information to patient/family  - Incorporate patient and family knowledge, values, beliefs, and cultural backgrounds into the planning and delivery of care  - Encourage patient/family to participate in care and decision-making at the level they choose  - Honor patient and family perspectives and choices  Outcome: Progressing     Problem: Patient/Family Goals  Goal: Patient/Family Long Term Goal  Description: Patient's Long Term Goal:     Interventions:  -   - See additional Care Plan goals for specific interventions  Outcome: Progressing  Goal: Patient/Family Short Term Goal  Description: Patient's Short Term Goal:     Interventions:   -   - See additional Care Plan goals for specific interventions  Outcome: Progressing     Problem: SAFETY ADULT - FALL  Goal: Free from fall injury  Description: INTERVENTIONS:  - Assess pt frequently for physical needs  - Identify cognitive and physical deficits and behaviors that affect risk of falls.  - Fischer fall precautions as indicated by assessment.  - Educate pt/family on patient safety including physical limitations  - Instruct pt to call for assistance with activity based on assessment  - Modify environment to reduce risk of injury  - Provide assistive devices as appropriate  - Consider OT/PT consult to assist with strengthening/mobility  - Encourage toileting schedule  Outcome: Progressing     Problem: CARDIOVASCULAR - ADULT  Goal: Maintains optimal cardiac output and hemodynamic stability  Description: INTERVENTIONS:  - Monitor vital signs, rhythm, and trends  -  Monitor for bleeding, hypotension and signs of decreased cardiac output  - Evaluate effectiveness of vasoactive medications to optimize hemodynamic stability  - Monitor arterial and/or venous puncture sites for bleeding and/or hematoma  - Assess quality of pulses, skin color and temperature  - Assess for signs of decreased coronary artery perfusion - ex. Angina  - Evaluate fluid balance, assess for edema, trend weights  Outcome: Progressing  Goal: Absence of cardiac arrhythmias or at baseline  Description: INTERVENTIONS:  - Continuous cardiac monitoring, monitor vital signs, obtain 12 lead EKG if indicated  - Evaluate effectiveness of antiarrhythmic and heart rate control medications as ordered  - Initiate emergency measures for life threatening arrhythmias  - Monitor electrolytes and administer replacement therapy as ordered  Outcome: Progressing     Problem: PAIN - ADULT  Goal: Verbalizes/displays adequate comfort level or patient's stated pain goal  Description: INTERVENTIONS:  - Encourage pt to monitor pain and request assistance  - Assess pain using appropriate pain scale  - Administer analgesics based on type and severity of pain and evaluate response  - Implement non-pharmacological measures as appropriate and evaluate response  - Consider cultural and social influences on pain and pain management  - Manage/alleviate anxiety  - Utilize distraction and/or relaxation techniques  - Monitor for opioid side effects  - Notify MD/LIP if interventions unsuccessful or patient reports new pain  - Anticipate increased pain with activity and pre-medicate as appropriate  Outcome: Progressing

## 2024-04-04 NOTE — PROCEDURES
[facility]  part of [facility] Cardiac Cath Procedure Note  Glenn Torres Patient Status:  Inpatient    1955 MRN [id number]   Location [facility] 2W/SW Attending Wing Martinez MD   Hosp Day # 1 PCP Jose Croft DO       Cardiologist: Arnoldo Bentley MD  Primary Proceduralist: Arnoldo Bentley MD  Procedure Performed: Staged intervention of the proximal LAD with NENA x 1/balloon angioplasty of the diagonal  Date of Procedure: 2024     Pre procedure diagnosis: Staged intervention after recent MI  Post procedure diagnosis: As above    Summary of Case: After written informed consent was obtained from the patient, patient was brought to the cardiac catheterization laboratory.  Patient was prepped and draped in the usual sterile fashion. Lidocaine 1% was used to infiltrate the right wrist for local anesthesia and a 5-6 Yi Slender introducer sheath was inserted into the right radial artery.        Post procedure findings:  1) Left Ventriculography at 30 degrees BARRERA: Not performed, echo available  2) Hemodynamics: Not obtained  3) Coronaries:  Please refer to diagnostic angiogram performed within the last few days    Specimen sent to: No specimen collected  Estimated blood loss: 10 cc  Closure: TR band      Lesion #1 proximal LAD  Lesion Characteristics-minimal torturous, minimal calcified.  Type non-C lesion.  Pre-intervention stenosis 80%, Post intervention stenosis 0%.  Pre CLINTON 3, Post CLINTON 3.     Guide Catheter: EBU 3.5  Wire: Prowater  Pre-dilation Balloon: 2 mm x 15 mm balloon in the diagonal@14 ODILIA  Stent: 3.5 mm x 16 mm Synergy XD in the LAD at 14 ODILIA  CLINTON III flow in the diagonal, small vessel after the LAD stent    IV was maintained by RN and moderate conscious sedation of versed and fentanyl was given.  Patient was assessed and monitoring of oxygen, heart rate and blood pressure by nurse and myself during the exam from 3:14 PM to 3:39 PM.      Arnoldo  MD Mc  04/04/24

## 2024-04-05 VITALS
HEIGHT: 66 IN | BODY MASS INDEX: 26.76 KG/M2 | TEMPERATURE: 98 F | SYSTOLIC BLOOD PRESSURE: 107 MMHG | RESPIRATION RATE: 22 BRPM | WEIGHT: 166.5 LBS | HEART RATE: 66 BPM | DIASTOLIC BLOOD PRESSURE: 74 MMHG | OXYGEN SATURATION: 98 %

## 2024-04-05 LAB
ANION GAP SERPL CALC-SCNC: 5 MMOL/L (ref 0–18)
BASOPHILS # BLD AUTO: 0.02 X10(3) UL (ref 0–0.2)
BASOPHILS NFR BLD AUTO: 0.3 %
BUN BLD-MCNC: 17 MG/DL (ref 9–23)
BUN/CREAT SERPL: 19.5 (ref 10–20)
CALCIUM BLD-MCNC: 9.3 MG/DL (ref 8.7–10.4)
CHLORIDE SERPL-SCNC: 110 MMOL/L (ref 98–112)
CO2 SERPL-SCNC: 26 MMOL/L (ref 21–32)
CREAT BLD-MCNC: 0.87 MG/DL
DEPRECATED RDW RBC AUTO: 47.3 FL (ref 35.1–46.3)
EGFRCR SERPLBLD CKD-EPI 2021: 94 ML/MIN/1.73M2 (ref 60–?)
EOSINOPHIL # BLD AUTO: 0.09 X10(3) UL (ref 0–0.7)
EOSINOPHIL NFR BLD AUTO: 1.3 %
ERYTHROCYTE [DISTWIDTH] IN BLOOD BY AUTOMATED COUNT: 13.2 % (ref 11–15)
GLUCOSE BLD-MCNC: 88 MG/DL (ref 70–99)
HCT VFR BLD AUTO: 37.3 %
HGB BLD-MCNC: 12.4 G/DL
IMM GRANULOCYTES # BLD AUTO: 0.02 X10(3) UL (ref 0–1)
IMM GRANULOCYTES NFR BLD: 0.3 %
LYMPHOCYTES # BLD AUTO: 2.01 X10(3) UL (ref 1–4)
LYMPHOCYTES NFR BLD AUTO: 29.2 %
MCH RBC QN AUTO: 32.2 PG (ref 26–34)
MCHC RBC AUTO-ENTMCNC: 33.2 G/DL (ref 31–37)
MCV RBC AUTO: 96.9 FL
MONOCYTES # BLD AUTO: 0.73 X10(3) UL (ref 0.1–1)
MONOCYTES NFR BLD AUTO: 10.6 %
NEUTROPHILS # BLD AUTO: 4.02 X10 (3) UL (ref 1.5–7.7)
NEUTROPHILS # BLD AUTO: 4.02 X10(3) UL (ref 1.5–7.7)
NEUTROPHILS NFR BLD AUTO: 58.3 %
OSMOLALITY SERPL CALC.SUM OF ELEC: 293 MOSM/KG (ref 275–295)
PLATELET # BLD AUTO: 161 10(3)UL (ref 150–450)
POTASSIUM SERPL-SCNC: 3.8 MMOL/L (ref 3.5–5.1)
RBC # BLD AUTO: 3.85 X10(6)UL
SODIUM SERPL-SCNC: 141 MMOL/L (ref 136–145)
WBC # BLD AUTO: 6.9 X10(3) UL (ref 4–11)

## 2024-04-05 PROCEDURE — 99239 HOSP IP/OBS DSCHRG MGMT >30: CPT | Performed by: INTERNAL MEDICINE

## 2024-04-05 PROCEDURE — 99232 SBSQ HOSP IP/OBS MODERATE 35: CPT | Performed by: INTERNAL MEDICINE

## 2024-04-05 RX ORDER — POTASSIUM CHLORIDE 20 MEQ/1
40 TABLET, EXTENDED RELEASE ORAL ONCE
Status: COMPLETED | OUTPATIENT
Start: 2024-04-05 | End: 2024-04-05

## 2024-04-05 RX ORDER — ATORVASTATIN CALCIUM 80 MG/1
80 TABLET, FILM COATED ORAL NIGHTLY
Qty: 30 TABLET | Refills: 0 | Status: SHIPPED | OUTPATIENT
Start: 2024-04-05

## 2024-04-05 RX ORDER — ASPIRIN 81 MG/1
81 TABLET ORAL DAILY
Qty: 30 TABLET | Refills: 0 | Status: SHIPPED | OUTPATIENT
Start: 2024-04-06

## 2024-04-05 NOTE — DIETARY NOTE
Dietitian Note: Diet Education.     Received Post Cardiac Intervention Consult.     Met with patient, spouse and grand-son this am. Provided Eating Heart-Healthy handout and discussed/answered questions.   Patient consumes 3 meals daily and appears to be focused on moderation/healthy food choices. Spouse cooks regularly. Does not use added salt. Limits processed foods.   Reviewed lab values on 4/2/24: T. Chol, Trig, LDL and HDL. Discussed with family.     Patient reports he plans on attending Out-Patient Cardiac Rehab where he will receive on-going nutrition reinforcement.     Appreciated RD visit.     Name and phone number of RD provided as needed.     Shayna Deleon RDN, LDN, CDE   Clinical Nutrition  Ext [phone]

## 2024-04-05 NOTE — PLAN OF CARE
Received pt AOx4 on room air. Vital signs stable overnight, no acute events.     Right radial site and right groin site remain clean/dry/intact and without complications.     Pt denied pain throughout shift.    Problem: CARDIOVASCULAR - ADULT  Goal: Maintains optimal cardiac output and hemodynamic stability  Description: INTERVENTIONS:  - Monitor vital signs, rhythm, and trends  - Monitor for bleeding, hypotension and signs of decreased cardiac output  - Evaluate effectiveness of vasoactive medications to optimize hemodynamic stability  - Monitor arterial and/or venous puncture sites for bleeding and/or hematoma  - Assess quality of pulses, skin color and temperature  - Assess for signs of decreased coronary artery perfusion - ex. Angina  - Evaluate fluid balance, assess for edema, trend weights  Outcome: Progressing  Goal: Absence of cardiac arrhythmias or at baseline  Description: INTERVENTIONS:  - Continuous cardiac monitoring, monitor vital signs, obtain 12 lead EKG if indicated  - Evaluate effectiveness of antiarrhythmic and heart rate control medications as ordered  - Initiate emergency measures for life threatening arrhythmias  - Monitor electrolytes and administer replacement therapy as ordered  Outcome: Progressing     Problem: PAIN - ADULT  Goal: Verbalizes/displays adequate comfort level or patient's stated pain goal  Description: INTERVENTIONS:  - Encourage pt to monitor pain and request assistance  - Assess pain using appropriate pain scale  - Administer analgesics based on type and severity of pain and evaluate response  - Implement non-pharmacological measures as appropriate and evaluate response  - Consider cultural and social influences on pain and pain management  - Manage/alleviate anxiety  - Utilize distraction and/or relaxation techniques  - Monitor for opioid side effects  - Notify MD/LIP if interventions unsuccessful or patient reports new pain  - Anticipate increased pain with activity and  pre-medicate as appropriate  Outcome: Progressing

## 2024-04-05 NOTE — PROGRESS NOTES
[facility]  part of [facility]     Progress Note    Glenn Torres Patient Status:  Inpatient    1955 MRN [id number]   Location [facility] 2W/SW Attending Wing Martinez MD   Hosp Day # 2 PCP Jose Croft,        Subjective:   Patient seen and examined.  Resting in bed.  Denies significant dyspnea or chest pain    Objective:   Blood pressure 96/81, pulse 77, temperature 97 °F (36.1 °C), temperature source Temporal, resp. rate 24, height 5' 6\" (1.676 m), weight 166 lb 8 oz (75.5 kg), SpO2 98%.  Intake/Output:   Last 3 shifts: I/O last 3 completed shifts:  In: 659.5 [P.O.:480; I.V.:179.5]  Out: 1000 [Urine:1000]   This shift: No intake/output data recorded.     Vent Settings:      Hemodynamic parameters (last 24 hours):      Scheduled Meds:         Continuous Infusions:     Physical Exam  Constitutional: no acute distress  Eyes: PERRL  ENT: nares pateint  Neck: supple, no JVD  Cardio: RRR, S1 S2  Respiratory: clear to auscultation bilaterally, no wheezing, rales, rhonchi, crackles  GI: abdomen soft, non tender, active bowel sounds, no organomegaly  Extremities: no clubbing, cyanosis, edema  Neurologic: no gross motor deficits  Skin: warm, dry      Results:     Lab Results   Component Value Date    WBC 6.9 2024    HGB 12.4 2024    HCT 37.3 2024    .0 2024    CREATSERUM 0.87 2024    BUN 17 2024     2024    K 3.8 2024     2024    CO2 26.0 2024    GLU 88 2024    CA 9.3 2024       No results found.          Assessment   1.  ST elevation myocardial infarction  2.  Coronary artery disease  3.  Hyperlipidemia     Plan   -Patient presented with evidence of chest pain.  Found to have ST elevation myocardial infarction.  Status post PCI of proximal circumflex.  Residual disease in proximal to mid LAD.  Underwent PCI of proximal LAD on 2024.  -Eval by cardiology.  2D echo with intact  ejection fraction no significant regional wall motion abnormality seen.  -Antiplatelet therapy per cardiology recommendations  -Possible discharge later today    Carlito Monterroso,   Pulmonary Critical Care Medicine  [facility]

## 2024-04-05 NOTE — PLAN OF CARE
Problem: SAFETY ADULT - FALL  Goal: Free from fall injury  Description: INTERVENTIONS:  - Assess pt frequently for physical needs  - Identify cognitive and physical deficits and behaviors that affect risk of falls.  - [place] fall precautions as indicated by assessment.  - Educate pt/family on patient safety including physical limitations  - Instruct pt to call for assistance with activity based on assessment  - Modify environment to reduce risk of injury  - Provide assistive devices as appropriate  - Consider OT/PT consult to assist with strengthening/mobility  - Encourage toileting schedule  Outcome: Completed     Problem: CARDIOVASCULAR - ADULT  Goal: Maintains optimal cardiac output and hemodynamic stability  Description: INTERVENTIONS:  - Monitor vital signs, rhythm, and trends  - Monitor for bleeding, hypotension and signs of decreased cardiac output  - Evaluate effectiveness of vasoactive medications to optimize hemodynamic stability  - Monitor arterial and/or venous puncture sites for bleeding and/or hematoma  - Assess quality of pulses, skin color and temperature  - Assess for signs of decreased coronary artery perfusion - ex. Angina  - Evaluate fluid balance, assess for edema, trend weights  Outcome: Completed  Goal: Absence of cardiac arrhythmias or at baseline  Description: INTERVENTIONS:  - Continuous cardiac monitoring, monitor vital signs, obtain 12 lead EKG if indicated  - Evaluate effectiveness of antiarrhythmic and heart rate control medications as ordered  - Initiate emergency measures for life threatening arrhythmias  - Monitor electrolytes and administer replacement therapy as ordered  Outcome: Completed     Problem: PAIN - ADULT  Goal: Verbalizes/displays adequate comfort level or patient's stated pain goal  Description: INTERVENTIONS:  - Encourage pt to monitor pain and request assistance  - Assess pain using appropriate pain scale  - Administer analgesics based on type and severity of  pain and evaluate response  - Implement non-pharmacological measures as appropriate and evaluate response  - Consider cultural and social influences on pain and pain management  - Manage/alleviate anxiety  - Utilize distraction and/or relaxation techniques  - Monitor for opioid side effects  - Notify MD/LIP if interventions unsuccessful or patient reports new pain  - Anticipate increased pain with activity and pre-medicate as appropriate  Outcome: Completed

## 2024-04-05 NOTE — PROGRESS NOTES
[facility]  Cardiology Progress Note    Glenn Torres Patient Status:  Inpatient    1955 MRN [id number]   Location [facility] 2W/SW Attending Wing Martinez MD   Hosp Day # 2 PCP Jose Croft, DO     Subjective     Feels great today, no chest pain or shortness of breath.    Objective:   Patient Vitals for the past 24 hrs:   BP Temp Temp src Pulse Resp SpO2 Weight   24 0615 -- -- -- -- -- -- 166 lb 8 oz (75.5 kg)   24 0600 106/69 -- -- 63 21 98 % --   24 0400 90/60 97.7 °F (36.5 °C) Temporal 63 20 95 % --   24 0200 109/73 -- -- 58 16 98 % --   24 0000 105/65 98.1 °F (36.7 °C) Temporal 58 19 99 % --   24 2315 101/72 -- -- 78 10 97 % --   24 2110 101/65 -- -- 75 24 97 % --   24 2000 103/64 98.2 °F (36.8 °C) Temporal 62 18 100 % --   24 1900 100/56 -- -- 67 18 98 % --   24 1800 107/66 -- -- 65 16 100 % --   24 1730 118/77 -- -- 58 17 98 % --   24 1700 129/77 -- -- 60 14 99 % --   24 1645 115/74 -- -- 61 14 96 % --   24 1630 114/75 -- -- 54 11 99 % --   24 1615 106/73 -- -- 68 19 97 % --   24 1600 112/68 96.9 °F (36.1 °C) Temporal 63 15 98 % --   24 1415 123/77 -- -- 79 17 98 % --   24 1200 106/73 97.9 °F (36.6 °C) Temporal 70 15 96 % --   24 1000 95/66 -- -- 61 19 94 % --     Intake/Output:   Last 3 shifts:   Intake/Output                   24 - 2459 24 - 2459 24 - 2459       Intake    P.O.  --  480  --    P.O. -- 480 --    I.V.  82.2  179.5  --    I.V. -- 179.5 --    Volume (mL) Cangrelor 59.3 -- --    Volume (mL) Heparin 22.9 -- --    Total Intake 82.2 659.5 --       Output    Urine  1750  1000  --    Urine 1750 1000 --    Urine Occurrence 1 x 1 x --    Stool  --  --  --    Stool Count Calculated for I/O 1 x -- --    Total Output 1750 1000 --       Net I/O     -1667.8 -340.5 --             Scheduled  Meds:    aspirin  81 mg Oral Daily    atorvastatin  80 mg Oral Nightly    metoprolol tartrate  25 mg Oral 2x Daily(Beta Blocker)    ticagrelor  90 mg Oral Q12H    heparin  5,000 Units Subcutaneous 2 times per day       Results:     Lab Results   Component Value Date    WBC 6.9 04/05/2024    HGB 12.4 (L) 04/05/2024    HCT 37.3 (L) 04/05/2024    .0 04/05/2024    CREATSERUM 0.87 04/05/2024    BUN 17 04/05/2024     04/05/2024    K 3.8 04/05/2024     04/05/2024    CO2 26.0 04/05/2024    GLU 88 04/05/2024    CA 9.3 04/05/2024    ALB 4.7 04/02/2024    ALKPHO 65 04/02/2024    BILT 0.4 04/02/2024    TP 8.2 04/02/2024    AST 47 (H) 04/02/2024    ALT 23 04/02/2024    .4 (H) 04/03/2024    TSH 2.862 04/02/2024       Recent Labs   Lab 04/02/24 2210 04/04/24  0345 04/05/24  0414   * 99 88   BUN 18 14 17   CREATSERUM 1.13 0.99 0.87   CA 10.0 9.1 9.3    143 141   K 4.0 4.2 3.8    113* 110   CO2 28.0 24.0 26.0     Recent Labs   Lab 04/02/24 2210 04/04/24  0345 04/05/24  0414   RBC 4.79 3.75* 3.85   HGB 15.5 12.3* 12.4*   HCT 46.9 37.2* 37.3*   MCV 97.9 99.2 96.9   MCH 32.4 32.8 32.2   MCHC 33.0 33.1 33.2   RDW 13.0 13.3 13.2   NEPRELIM 3.04  --  4.02   WBC 7.1 8.8 6.9   .0 173.0 161.0       Recent Labs   Lab 04/02/24 2210   BNPML 18       No results for input(s): \"TROP\", \"CK\" in the last 168 hours.    XR CHEST AP PORTABLE  (CPT=71045)    Result Date: 4/3/2024  CONCLUSION: Prominent pulmonary arteries suggesting pulmonary artery hypertension.  No definite acute cardiopulmonary abnormality.   [facility] provided a preliminary report for this examination. This final report agrees with their preliminary findings.   Dictated by (CST): Bogdan Butterfield MD on 4/03/2024 at 12:20 PM     Finalized by (CST): Bogdan Butterfield MD on 4/03/2024 at 12:22 PM         EKG 12 Lead    Result Date: 4/3/2024  Normal sinus rhythm ST & T wave abnormality, consider lateral ischemia Abnormal ECG When  compared with ECG of 2024 21:53, Vent. rate has increased BY  34 BPM ST no longer elevated in Inferior leads Non-specific change in ST segment in Lateral leads Nonspecific T wave abnormality now evident in Inferior leads T wave inversion now evident in Anterior-lateral leads Confirmed by DOREEN WEBBER MICHAEL ([id number]) on 4/3/2024 4:07:42 PM   CARD ECHO 2D DOPPLER (CPT=93306)    Result Date: 4/3/2024  Transthoracic Echocardiogram Name:Glenn Torres Date: 2024 :  1955 Ht:  (66in)  BP: 129 / 87 MRN:  [id number]    Age:  68years    Wt:  (167lb) HR: 90bpm Loc:  [facility]       Gndr: M          BSA: 1.85m^2 Sonographer: Arnold Ordering:    Elia Ferguson Consulting:  Petar Mantilla ---------------------------------------------------------------------------- History/Indications:   Risk factors:  Acute Myocardial Infarction. ---------------------------------------------------------------------------- Procedure information:  A transthoracic complete 2D study was performed. Additional evaluation included M-mode, complete spectral Doppler, and color Doppler.  Patient status:  Inpatient.  Location:  [facility]    No prior study was available for comparison.    This was a routine study. Transthoracic echocardiography for diagnosis and ventricular function evaluation. Image quality was adequate. ECG rhythm:   Normal sinus ---------------------------------------------------------------------------- Conclusions: Left ventricle: The cavity size was normal. Wall thickness was normal. The estimated ejection fraction was 55-60%, by biplane method of disks. Wall motion is normal; there are no regional wall motion abnormalities. Doppler parameters are consistent with abnormal left ventricular relaxation - grade 1 diastolic dysfunction. Impressions:  No previous study was available for comparison. * ---------------------------------------------------------------------------- * Findings: Left ventricle:  The cavity size was  normal. Wall thickness was normal. The estimated ejection fraction was 55-60%, by biplane method of disks. Wall motion is normal; there are no regional wall motion abnormalities. Doppler parameters are consistent with abnormal left ventricular relaxation - grade 1 diastolic dysfunction. Left atrium:  The left atrial volume was normal. Right ventricle:  The cavity size was normal. Systolic function was normal. Right atrium:  Well visualized. The atrium was normal in size. Mitral valve:  The leaflets were mildly calcified. Leaflet separation was normal.  Doppler:  Transvalvular velocity was within the normal range. There was no evidence for stenosis. There was trace regurgitation.    The valve area by pressure half-time was 4.15cm^2. The valve area index by pressure half-time was 2.24cm^2/m^2. Aortic valve:  The valve was structurally normal. The valve was trileaflet. Cusp separation was normal.  Doppler:  Transvalvular velocity was within the normal range. There was no evidence for stenosis. There was trivial regurgitation.    The peak systolic gradient was 8mm Hg. Tricuspid valve:  The valve is structurally normal. Leaflet separation was normal.  Doppler:  Transvalvular velocity was within the normal range. There was no evidence for stenosis. There was trivial regurgitation. Pulmonic valve:   The valve is structurally normal. Cusp separation was normal.  Doppler:  Transvalvular velocity was within the normal range. There was no evidence for stenosis. There was trivial regurgitation. Aorta: Aortic root: The aortic root was normal. Pulmonary arteries: Systolic pressure was within the normal range, estimated to be 23mm Hg. Systemic veins:  Central venous respirophasic diameter changes are in the normal range (>50%). Inferior vena cava: The IVC was normal-sized. ---------------------------------------------------------------------------- Measurements  Left ventricle                  Value          Ref  IVS thickness,  ED, PLAX         1.0   cm       0.6 - 1.0  LV ID, ED, PLAX                 4.7   cm       4.2 - 5.8  LV ID, ES, PLAX                 3.2   cm       2.5 - 4.0  LV PW thickness, ED, PLAX       0.9   cm       0.6 - 1.0  IVS/LV PW ratio, ED, PLAX       1.11           ---------  LV PW/LV ID ratio, ED, PLAX     0.19           ---------  LV area, ES, A4C                17.6  cm^2     ---------  LV ejection fraction            60    %        52 - 72  Stroke volume/bsa, 2D           43    ml/m^2   ---------  LV end-diastolic volume,        105   ml       69 - 185  1-p A4C  LV end-systolic volume, 1-p     41    ml       22 - 78  A4C  LV ejection fraction, 1-p       59    %        46 - 74  A4C  Stroke volume, 1-p A4C          62    ml       ---------  LV end-diastolic                57    ml/m^2   37 - 93  volume/bsa, 1-p A4C  LV end-systolic volume/bsa,     22    ml/m^2   12 - 40  1-p A4C  Stroke volume/bsa, 1-p A4C      33    ml/m^2   ---------  LV end-diastolic volume,        102   ml       62 - 150  2-p  LV end-systolic volume, 2-p     43    ml       21 - 61  LV ejection fraction, 2-p       58    %        52 - 72  Stroke volume, 2-p              59    ml       ---------  LV end-diastolic                55    ml/m^2   34 - 74  volume/bsa, 2-p  LV end-systolic volume/bsa,     23    ml/m^2   11 - 31  2-p  Stroke volume/bsa, 2-p          31.8  ml/m^2   ---------  LV e', lateral              (L) 8.3   cm/sec   >=10.0  LV E/e', lateral                9              <=13  LV e', medial                   7.5   cm/sec   >=7.0  LV E/e', medial                 10             ---------  LV e', average                  7.9   cm/sec   ---------  LV E/e', average                9              <=14  LVOT                            Value          Ref  LVOT ID                         2.3   cm       ---------  LVOT peak velocity, S           1.2   m/sec    ---------  LVOT VTI, S                     19.3  cm       ---------  LVOT peak  gradient, S           6     mm Hg    ---------  LVOT mean gradient, S           3     mm Hg    ---------  Stroke volume (SV), LVOT DP     80    ml       ---------  Stroke index (SV/bsa), LVOT     43    ml/m^2   ---------  DP  Aortic valve                    Value          Ref  Aortic valve peak velocity,     1.4   m/sec    ---------  S  Aortic peak gradient, S         8     mm Hg    ---------  Velocity ratio, peak,           0.86           ---------  LVOT/AV  Aortic root                     Value          Ref  Aortic root ID                  3.6   cm       2.6 - 4.0  Left atrium                     Value          Ref  LA volume, S                    53    ml       18 - 58  LA volume/bsa, S                28    ml/m^2   16 - 34  LA volume, ES, 1-p A4C          49    ml       18 - 58  LA volume, ES, 1-p A2C          53    ml       18 - 58  LA volume, ES, A/L              55    ml       ---------  LA volume/bsa, ES, A/L          30    ml/m^2   16 - 34  Mitral valve                    Value          Ref  Mitral E-wave peak velocity     0.75  m/sec    ---------  Mitral A-wave peak velocity     1.01  m/sec    ---------  Mitral deceleration time        179   ms       ---------  Mitral pressure half-time       53    ms       ---------  Mitral peak gradient, D         2     mm Hg    ---------  Mitral E/A ratio, peak          0.7            ---------  Mitral valve area, PHT, DP      4.15  cm^2     ---------  Mitral valve area/bsa, PHT,     2.24  cm^2/m^2 ---------  DP  Pulmonary artery                Value          Ref  PA pressure, S, DP              23    mm Hg    ---------  Tricuspid valve                 Value          Ref  Tricuspid regurg peak           2.23  m/sec    <=2.8  velocity  Tricuspid peak RV-RA            20    mm Hg    ---------  gradient  Right atrium                    Value          Ref  RA ID, S-I, ES, A4C             4.4   cm       3.4 - 5.3  RA ID/bsa, S-I, ES, A4C         2.4   cm/m^2   1.8 - 3.0  RA  area, ES, A4C                11    cm^2     10 - 18  RA volume, ES, 1-p A4C          22    ml       ---------  RA volume/bsa, ES, 1-p A4C      12    ml/m^2   11 - 39  Systemic veins                  Value          Ref  Estimated CVP                   3     mm Hg    ---------  Inferior vena cava              Value          Ref  ID                              1.6   cm       <=2.1  Right ventricle                 Value          Ref  TAPSE, 2D                       2.00  cm       >=1.70  TAPSE, MM                       2.00  cm       >=1.70  RV pressure, S, DP              23    mm Hg    ---------  RV s', lateral                  21.0  cm/sec   >=9.5 Legend: (L)  and  (H)  josefa values outside specified reference range. ---------------------------------------------------------------------------- Prepared and electronically signed by Angie Ca 04/03/2024 13:17        Exam:     General: Alert and oriented x 3. No apparent distress.   HEENT: Normocephalic, anicteric sclera, neck supple, no thyromegaly or adenopathy.  Neck: No JVD, carotids 2+, no bruits.  Cardiac: Regular rate and rhythm. S1, S2 normal. No murmur, pericardial rub, S3, or extra cardiac sounds.  Lungs: Clear without wheezes, rales, rhonchi or dullness.  Normal excursions and effort.  Abdomen: Soft, non-tender. No organosplenomegally, mass or rebound, BS-present.  Extremities: Without clubbing or cyanosis. No edema.    Neurologic: Alert and oriented, normal affect. No focal defects  Skin: Warm and dry.     Assessment and Plan:   CAD, inferior STEMI s/p PCI proximal LCx on 4/2/2024, staged PCI proximal LAD 4/4/2024  - TTE with normal LVEF, no valvular issues  - Currently feels well, no further chest discomfort or shortness of breath  - Continue aspirin 81 mg daily, Brilinta 90 mg twice daily, atorvastatin 80 mg daily, metoprolol tartrate 25 mg twice daily  - Referral for outpatient cardiac rehab    Patient to be discharged home today.  Will need follow-up in  our office in several weeks.    Jr Burton MD  [facility] [place]  4/5/2024

## 2024-04-05 NOTE — DISCHARGE SUMMARY
[facility]  part of [facility]    Discharge Summary    Glenn Torres Patient Status:  Inpatient    1955 MRN [id number]   Location [facility] 2W/SW Attending Wing Martinez MD   Hosp Day # 2 PCP Jose Croft DO     Date of Admission: 2024     Date of Discharge: 24      Lace+ Score: 43  59-90 High Risk  29-58 Medium Risk  0-28   Low Risk.    TCM Follow-Up Recommendation:  LACE 29-58: Moderate Risk of readmission after discharge from the hospital.    DISCHARGE DX: Principal Problem:    Acute ST elevation myocardial infarction (STEMI), unspecified artery (HCC)       The patient was seen and examined on day of discharge and this discharge summary is in conjunction with any daily progress note from day of discharge.    HPI per admitting physician: \"Glenn Torres is a(n) 68 year old male, with a past medical history significant for hyperlipidemia presents with complaint of burning chest pain ongoing for the last 2 days.  Describes the onset as sudden intermittent nature with no clear aggravating or relieving factors however prior to arrival to the ER pain much worse, more constant 6 out of 10 at its worst, nonradiating associated with diaphoresis and nausea.  Denies any emesis or shortness of breath.  EKG done in ER shows evidence of ST elevations in inferior and lateral leads along with elevated troponin\"    Hospital Course:        Acute ST elevation myocardial infarction (STEMI), unspecified artery (HCC)  -Patient presenting with Chest Pain  -Noted to have ST elevations on EKG.  -Initially started on heparin GTT, discontinued post cardiac catheterization  - Status post PCI of proximal circumflex on .  -Noted to have residual disease in the proximal to mid LAD. S/p repeat cardiac catheterization on  with PCI to LAD.  -Continue aspirin, ticagrelor, beta-blocker and statin.  -Cardiology consulted, follow up as outpt.      HTN  - controlled  - CPM  - Monitor  and adjust as needed     Hyperlipidemia  -Continue statin        Physical Exam:    Vitals:    04/05/24 0600 04/05/24 0615 04/05/24 0800 04/05/24 1000   BP: 106/69  113/58 96/81   BP Location: Right arm  Right arm Right arm   Pulse: 63  63 77   Resp: 21 20 24   Temp:   97 °F (36.1 °C)    TempSrc:   Temporal    SpO2: 98%  95% 98%   Weight:  166 lb 8 oz (75.5 kg)     Height:         Patient Weight for the past 72 hrs:   Weight   04/02/24 2157 165 lb (74.8 kg)   04/03/24 0129 167 lb 15.9 oz (76.2 kg)   04/04/24 0600 159 lb 9.8 oz (72.4 kg)   04/05/24 0615 166 lb 8 oz (75.5 kg)       Intake/Output Summary (Last 24 hours) at 4/5/2024 1303  Last data filed at 4/5/2024 0615  Gross per 24 hour   Intake 659.5 ml   Output --   Net 659.5 ml       GENERAL:  Awake and alert, in no acute distress.  HEART:  Regular rhythm, regular rate  LUNGS:  Air entry was minimally decreased.  No crackles or wheezes   ABDOMEN: Soft and non-tender.    PSYCHIATRIC: Normal mood    CULTURE:   No results found for this visit on 04/02/24.    IMAGING STUDIES: SOME MAY NEED FOLLOW UP WITH PCP   XR CHEST AP PORTABLE  (CPT=71045)    Result Date: 4/3/2024  CONCLUSION: Prominent pulmonary arteries suggesting pulmonary artery hypertension.  No definite acute cardiopulmonary abnormality.   [facility] provided a preliminary report for this examination. This final report agrees with their preliminary findings.   Dictated by (CST): Bogdan Butterfield MD on 4/03/2024 at 12:20 PM     Finalized by (CST): Bogdan Butterfield MD on 4/03/2024 at 12:22 PM           LABS :     Lab Results   Component Value Date    WBC 6.9 04/05/2024    HGB 12.4 (L) 04/05/2024    HCT 37.3 (L) 04/05/2024    .0 04/05/2024    CREATSERUM 0.87 04/05/2024    BUN 17 04/05/2024     04/05/2024    K 3.8 04/05/2024     04/05/2024    CO2 26.0 04/05/2024    GLU 88 04/05/2024    CA 9.3 04/05/2024    ALB 4.7 04/02/2024    ALKPHO 65 04/02/2024    BILT 0.4 04/02/2024    TP 8.2 04/02/2024     AST 47 (H) 04/02/2024    ALT 23 04/02/2024    .4 (H) 04/03/2024    TSH 2.862 04/02/2024       Recent Labs   Lab 04/02/24 2210 04/04/24 0345 04/05/24 0414   RBC 4.79 3.75* 3.85   HGB 15.5 12.3* 12.4*   HCT 46.9 37.2* 37.3*   MCV 97.9 99.2 96.9   MCH 32.4 32.8 32.2   MCHC 33.0 33.1 33.2   RDW 13.0 13.3 13.2   NEPRELIM 3.04  --  4.02   WBC 7.1 8.8 6.9   .0 173.0 161.0     Recent Labs   Lab 04/02/24 2210 04/04/24 0345 04/05/24 0414   * 99 88   BUN 18 14 17   CREATSERUM 1.13 0.99 0.87   CA 10.0 9.1 9.3   ALB 4.7  --   --     143 141   K 4.0 4.2 3.8    113* 110   CO2 28.0 24.0 26.0   ALKPHO 65  --   --    AST 47*  --   --    ALT 23  --   --    BILT 0.4  --   --    TP 8.2  --   --      No results found for: \"PT\", \"INR\"    Disposition: Discharge to Home    Condition at Discharge: Stable     Discharge Medications:      Discharge Medications        START taking these medications        Instructions Prescription details   aspirin 81 MG Tbec  Start taking on: April 6, 2024      Take 1 tablet (81 mg total) by mouth daily.   Quantity: 30 tablet  Refills: 0     atorvastatin 80 MG Tabs  Commonly known as: Lipitor      Take 1 tablet (80 mg total) by mouth nightly.   Quantity: 30 tablet  Refills: 0     metoprolol tartrate 25 MG Tabs  Commonly known as: Lopressor      Take 1 tablet (25 mg total) by mouth 2x Daily(Beta Blocker).   Quantity: 60 tablet  Refills: 0     ticagrelor 90 MG Tabs  Commonly known as: Brilinta      Take 1 tablet (90 mg total) by mouth every 12 (twelve) hours.   Quantity: 60 tablet  Refills: 0            CONTINUE taking these medications        Instructions Prescription details   B Complex Tabs      1 tablet daily   Refills: 0     GARLIC/EPA OR      garlic   Refills: 0     MULTIVITAMIN TAB/CAP      Take by mouth daily.   Refills: 0            STOP taking these medications      ergocalciferol 1.25 MG (01711 UT) Caps  Commonly known as: Vitamin D2                  Where to Get  Your Medications        These medications were sent to [id number] - [place], [place] - [address] AT [facility], [phone], [phone]  [address], [place] [facility] [address]      Hours: 24-hours Phone: [phone]   aspirin 81 MG Tbec  atorvastatin 80 MG Tabs  metoprolol tartrate 25 MG Tabs  ticagrelor 90 MG Tabs         Follow up Visits  No follow-up provider specified.  Jose Croft,     Consultants         Provider   Role Specialty     Manolo Velez MD  Consulting Physician Interventional, Cardiology     Annalee, Petar ROJAS MD  Consulting Physician PULMONARY DISEASES              Other Discharge Instructions:       ----------------------------------------------------  37 MIN SPENT ON THIS DC   Wing Martinez MD    4/5/2024

## 2024-04-08 ENCOUNTER — PATIENT OUTREACH (OUTPATIENT)
Dept: CASE MANAGEMENT | Age: 69
End: 2024-04-08

## 2024-04-08 DIAGNOSIS — I21.3 ACUTE ST ELEVATION MYOCARDIAL INFARCTION (STEMI), UNSPECIFIED ARTERY (HCC): Primary | ICD-10-CM

## 2024-04-08 DIAGNOSIS — Z02.9 ENCOUNTERS FOR UNSPECIFIED ADMINISTRATIVE PURPOSE: ICD-10-CM

## 2024-04-08 PROCEDURE — 1111F DSCHRG MED/CURRENT MED MERGE: CPT

## 2024-04-08 NOTE — PROGRESS NOTES
Attempted to reach the patient to complete a [facility]-Hospital FU call. Left a message for the pt to call the [facility] back at, [phone].

## 2024-04-09 NOTE — PROGRESS NOTES
Initial Post Discharge Follow Up   Discharge Date: 4/5/24  Contact Date: 4/9/2024    Consent Verification:  Assessment Completed With: Patient  HIPAA Verified?  Yes    Discharge Dx:   Acute ST elevation myocardial infarction (STEMI), unspecified artery   HTN  Hyperlipidemia    General:   How have you been since your discharge from the hospital? The patient denies any chest pain, SOB/trouble breathing, n/v, dizziness, headache,left arm pain, fast/irregular heart beat, or edema. The patient did admit to brushing at the right groin incision site but reported right wrist and groin incisions are healing well.  The patient denies any redness, pain, edema, discharge/bleeding, foul odor or heat coming from the incision sites. The incision/wound was described as intact and soft to the touch. The patient also denies any malaise or fever. The patient also denies any right arm/leg numbness/tingling, weakness, or pallor/cyanosis. The patient denies any bilateral calf edema, pain, warmth, redness or tenderness. The patient also denies any hematuria, melena, hematochezia, or bleeding from the gums, nose or cuts. The patient reported BP reading was 93/60 yesterday.   Do you have any pain since discharge?  No    How well was your pain managed while in the hospital?   On a scale of 1-5   1- Very Poor and 5- Very well   Very Well  When you were leaving the hospital were your discharge instructions reviewed with you? Yes  How well were your discharge instructions explained to you?   On a scale of 1-5   1- Very Poor and 5- Very well   Very Well  Do you have any questions about your discharge instructions?  No  Before leaving the hospital was your diagnoses explained to you? Yes  Do you have any questions about your diagnoses? No  Are you able to perform normal daily activities of living as you have prior to your hospital stay (dressing, bathing, ambulating to the bathroom, etc)? yes  (NCM) Was patient given a different diet per AVS?  no      Medications: NCM did confirm the patient has discontinued the following as directed:  STOP taking:  ergocalciferol 1.25 MG (23369 UT) Caps (Vitamin D2)  Current Outpatient Medications   Medication Sig Dispense Refill    aspirin 81 MG Oral Tab EC Take 1 tablet (81 mg total) by mouth daily. 30 tablet 0    atorvastatin 80 MG Oral Tab Take 1 tablet (80 mg total) by mouth nightly. 30 tablet 0    metoprolol tartrate 25 MG Oral Tab Take 1 tablet (25 mg total) by mouth 2x Daily(Beta Blocker). 60 tablet 0    ticagrelor 90 MG Oral Tab Take 1 tablet (90 mg total) by mouth every 12 (twelve) hours. 60 tablet 0    Multiple Vitamin (MULTIVITAMIN TAB/CAP) Take by mouth daily.        B Complex Oral Tab 1 tablet daily      Garlic-DHA-EPA (GARLIC/EPA OR) garlic       Were there any changes to your current medication(s) noted on the AVS? Yes  If so, were these medication changes discussed with you prior to leaving the hospital? Yes  If a new medication was prescribed:    Was the new medication's purpose & side effects reviewed? Yes  Do you have any questions about your new medication? No; NCM did review antiplatelet therapy with the patient in detail including possible side effects, and the importance of refraining from NSAID use.     Did you  your discharge medications when you left the hospital? Yes  Let's go over your medications together to make sure we are not missing anything. Medications Reviewed  Are there any reasons that keep you from taking your medication as prescribed? No      Discharge medications reviewed/discussed/and reconciled against outpatient medications with patient.  Any changes or updates to medications sent to PCP.  Patient Acknowledged     Referrals/orders at D/C:  Referrals/orders placed at D/C? no    Except for Home Health Services mentioned above, have you scheduled these other services? Yes The patient is scheduled to begin cardiac rehab on 5/1/2024.   If yes, have you started these  services? no    DME ordered at D/C? No      Discharge orders, AVS reviewed and discussed with patient. Any changes or updates to orders sent to PCP.  Patient Acknowledged      SDOH:   Transportation:    Transportation Needs: No Transportation Needs (4/9/2024)    Transportation Needs     Lack of Transportation: No     Financial Strain:    Financial Resource Strain: Low Risk  (4/9/2024)    Financial Resource Strain     Difficulty of Paying Living Expenses: Not hard at all     Med Affordability: No     Diagnosis specifics:   Have you been experiencing any symptoms since you returned home from the hospital?       No  Any shortness of breath?  no  Did you have a procedure (cardiac cath or angiogram) while in the hospital? yes    If yes, is the site healed?    yes      Is the site red or swollen?  no      Any signs of infection?    no  Any bleeding from the site?  no    Have you scheduled an appointment with Cardiac Rehab?  yes       If Yes, when: 5/1/2024    Follow up appointments:      Your appointments       Date & Time Appointment Department (Center)    May 01, 2024 11:00 AM CDT [place] [facility] Orientation with [facility] CARD ORIENTATION [facility] ([facility])        Jul 24, 2024 10:15 AM CDT Exam - New Patient with Glenn Santana MD [facility] ([facility])    Expect to be in the office for approximately 2 hours              [facility]  [address] [address]  [phone] [address] [id number] [phone]          TCC  Was TCC ordered: No    PCP (If no TCC appointment)  Does patient already have a PCP appointment scheduled? No  NCM Scheduled PCP office TCM appointment with patient on 4/10/2024  with Dr. Croft.       Specialist    Does the patient have any other follow up appointment(s) needing to be scheduled? No  If yes: NCM reviewed upcoming specialist appointment with patient: Yes  The patient reported having an appointment scheduled with Dr. Ca on 4/30/2024.       Is there any reason as to why you cannot make your appointment(s)?  No     Needs post D/C:   Now that you are home, are there any needs or concerns you need addressed before your next visit with your PCP?  (DME, meds, questions, etc.): No    Interventions by NCM:    Reviewed all discharge instructions with the patient with a focus on post-op directions/restrictions/wound care and antiplatelet therapy. All medications were reviewed and educated on the importance of taking the medications as directed.  Patient symptoms were assessed, education was completed for signs/symptoms to monitor, and reviewed when to contact providers vs go to the ED/call 911. Appointments were reviewed and stressed the importance of a close follow up with providers. A TCM-HFU appointment was scheduled. The patient verbalized understanding and will contact the office with any further questions or concerns.       Overall Rating:   How would you rate the care you received while in the hospital? excellent    CCM referral placed:    No    BOOK BY DATE: 4/19/2024

## 2024-04-10 ENCOUNTER — LAB ENCOUNTER (OUTPATIENT)
Dept: LAB | Age: 69
End: 2024-04-10
Attending: FAMILY MEDICINE
Payer: MEDICARE

## 2024-04-10 ENCOUNTER — OFFICE VISIT (OUTPATIENT)
Dept: FAMILY MEDICINE CLINIC | Facility: CLINIC | Age: 69
End: 2024-04-10
Payer: MEDICARE

## 2024-04-10 VITALS
SYSTOLIC BLOOD PRESSURE: 113 MMHG | BODY MASS INDEX: 27.32 KG/M2 | HEIGHT: 66 IN | DIASTOLIC BLOOD PRESSURE: 71 MMHG | HEART RATE: 58 BPM | WEIGHT: 170 LBS

## 2024-04-10 DIAGNOSIS — I21.3 ST ELEVATION MYOCARDIAL INFARCTION (STEMI), UNSPECIFIED ARTERY (HCC): Primary | ICD-10-CM

## 2024-04-10 DIAGNOSIS — D64.9 ANEMIA, UNSPECIFIED TYPE: ICD-10-CM

## 2024-04-10 DIAGNOSIS — E78.2 MIXED HYPERLIPIDEMIA: ICD-10-CM

## 2024-04-10 LAB
HCT VFR BLD AUTO: 39.3 %
HGB BLD-MCNC: 13.5 G/DL

## 2024-04-10 PROCEDURE — 85018 HEMOGLOBIN: CPT

## 2024-04-10 PROCEDURE — 36415 COLL VENOUS BLD VENIPUNCTURE: CPT

## 2024-04-10 PROCEDURE — 99496 TRANSJ CARE MGMT HIGH F2F 7D: CPT | Performed by: FAMILY MEDICINE

## 2024-04-10 PROCEDURE — 85014 HEMATOCRIT: CPT

## 2024-04-10 NOTE — PROGRESS NOTES
Subjective:   Glenn Torres is a 68 year old male who presents for hospital follow up.   He was discharged from [facility] to Home or Self Care  Admission Date: 4/2/24   Discharge Date: 4/5/24  Hospital Discharge Diagnosis: STEMI    Interactive contact within 2 business days post discharge first initiated on Date: 4/8/2024    During the visit, the following was completed:  Obtained and reviewed discharge summary, continuity of care documents, and Hospitalist notes  Reviewed Labs (CBC, CMP)    HPI: FEELS WELL CURRENTLY RECENT STEMI    History/Other:   Current Medications:  Medication Reconciliation:  I am aware of an inpatient discharge within the last 30 days.  The discharge medication list has been reconciled with the patient's current medication list and reviewed by me.  See medication list for additions of new medication, and changes to current doses of medications and discontinued medications.  Outpatient Medications Marked as Taking for the 4/10/24 encounter (Office Visit) with Jose Croft, DO   Medication Sig    aspirin 81 MG Oral Tab EC Take 1 tablet (81 mg total) by mouth daily.    atorvastatin 80 MG Oral Tab Take 1 tablet (80 mg total) by mouth nightly.    metoprolol tartrate 25 MG Oral Tab Take 1 tablet (25 mg total) by mouth 2x Daily(Beta Blocker).    ticagrelor 90 MG Oral Tab Take 1 tablet (90 mg total) by mouth every 12 (twelve) hours.    Multiple Vitamin (MULTIVITAMIN TAB/CAP) Take by mouth daily.      B Complex Oral Tab 1 tablet daily    Garlic-DHA-EPA (GARLIC/EPA OR) garlic       Review of Systems  GENERAL: feels well otherwise  SKIN: denies any unusual skin lesions  EYES: denies blurred vision or double vision  HEENT: denies nasal congestion, sinus pain or ST  LUNGS: denies shortness of breath with exertion  CARDIOVASCULAR: denies chest pain on exertion  GI: denies abdominal pain, denies heartburn  : 1 per night nocturia, no complaint of urinary incontinence  MUSCULOSKELETAL: denies back  pain  NEURO: denies headaches  PSYCHE: denies depression or anxiety  HEMATOLOGIC: denies hx of anemia  ENDOCRINE: denies thyroid history  ALL/ASTHMA: denies hx of allergy or asthma    Objective:   Physical Exam  General Appearance:  Alert, cooperative, no distress, appears stated age   Head:  Normocephalic, without obvious abnormality, atraumatic   Eyes:  PERRL, conjunctiva/corneas clear, EOM's intact, both eyes   Ears:  Normal TM's and external ear canals, both ears   Nose: Nares normal, septum midline, mucosa normal, no drainage or sinus tenderness   Throat: Lips, mucosa, and tongue normal; teeth and gums normal   Neck: Supple, symmetrical, trachea midline, no adenopathy, thyroid: not enlarged, symmetric, no tenderness/mass/nodules, no carotid bruit or JVD   Back:   Symmetric, no curvature, ROM normal, no CVA tenderness   Lungs:   Clear to auscultation bilaterally, respirations unlabored   Chest Wall:  No tenderness or deformity   Heart:  Regular rate and rhythm, S1, S2 normal, no murmur, rub or gallop   Abdomen:   Soft, non-tender, bowel sounds active all four quadrants,  no masses, no organomegaly   Genitalia: Normal male   Rectal: Normal tone, normal prostate, no masses or tenderness   Extremities: Extremities normal, atraumatic, no cyanosis or edema   Pulses: 2+ and symmetric   Skin: Skin color, texture, turgor normal, no rashes or lesions   Lymph nodes: Cervical, supraclavicular, and axillary nodes normal   Neurologic: Normal     /71 (BP Location: Left arm, Patient Position: Sitting)   Pulse 58   Ht 5' 6\" (1.676 m)   Wt 170 lb (77.1 kg)   BMI 27.44 kg/m²  Estimated body mass index is 27.44 kg/m² as calculated from the following:    Height as of this encounter: 5' 6\" (1.676 m).    Weight as of this encounter: 170 lb (77.1 kg).    Assessment & Plan:   1. ST elevation myocardial infarction (STEMI), unspecified artery (HCC) (Primary)  2. Mixed hyperlipidemia  3. Anemia, unspecified type  -      Hemoglobin & Hematocrit; Future; Expected date: 04/10/2024  1. ST elevation myocardial infarction (STEMI), unspecified artery (HCC)      Medication list as patient is currently taking also follow-up for cardiac rehab    2. Mixed hyperlipidemia  Atorvastatin    3. Anemia, unspecified type  Blood test today follow-up in 2 months for Medicare annual  - Hemoglobin & Hematocrit; Future        Return in about 2 months (around 6/10/2024) for MEDICARE ANNUAL PHYSICAL.

## 2024-04-15 NOTE — PROGRESS NOTES
The patient contacted the [facility] requesting review of possible interactions with Brilinta and supplements.     The patient will be holding supplements until seen by cardiology later this month. The patient will bring supplements and a list of active ingredients to the cardiology appointment.       The patient was requesting a written copy of home instructions after a cardiac catheterization. [facility] sent this to the patient via [employer].

## 2024-05-01 ENCOUNTER — CARDPULM VISIT (OUTPATIENT)
Dept: CARDIAC REHAB | Facility: HOSPITAL | Age: 69
End: 2024-05-01
Attending: INTERNAL MEDICINE
Payer: MEDICARE

## 2024-05-06 ENCOUNTER — CARDPULM VISIT (OUTPATIENT)
Dept: CARDIAC REHAB | Facility: HOSPITAL | Age: 69
End: 2024-05-06
Attending: INTERNAL MEDICINE
Payer: MEDICARE

## 2024-05-06 PROCEDURE — 93798 PHYS/QHP OP CAR RHAB W/ECG: CPT

## 2024-05-08 ENCOUNTER — APPOINTMENT (OUTPATIENT)
Dept: CARDIAC REHAB | Facility: HOSPITAL | Age: 69
End: 2024-05-08
Attending: INTERNAL MEDICINE
Payer: MEDICARE

## 2024-05-09 ENCOUNTER — APPOINTMENT (OUTPATIENT)
Dept: CARDIAC REHAB | Facility: HOSPITAL | Age: 69
End: 2024-05-09
Attending: INTERNAL MEDICINE
Payer: MEDICARE

## 2024-05-09 ENCOUNTER — ORDER TRANSCRIPTION (OUTPATIENT)
Dept: CARDIAC REHAB | Facility: HOSPITAL | Age: 69
End: 2024-05-09

## 2024-05-09 DIAGNOSIS — I25.10 CORONARY ARTERIOSCLEROSIS AFTER PERCUTANEOUS TRANSLUMINAL CORONARY ANGIOPLASTY (PTCA): Primary | ICD-10-CM

## 2024-05-09 DIAGNOSIS — Z98.61 CORONARY ARTERIOSCLEROSIS AFTER PERCUTANEOUS TRANSLUMINAL CORONARY ANGIOPLASTY (PTCA): Primary | ICD-10-CM

## 2024-05-13 ENCOUNTER — APPOINTMENT (OUTPATIENT)
Dept: CARDIAC REHAB | Facility: HOSPITAL | Age: 69
End: 2024-05-13
Attending: INTERNAL MEDICINE
Payer: MEDICARE

## 2024-05-14 ENCOUNTER — CARDPULM VISIT (OUTPATIENT)
Dept: CARDIAC REHAB | Facility: HOSPITAL | Age: 69
End: 2024-05-14
Attending: INTERNAL MEDICINE
Payer: MEDICARE

## 2024-05-14 PROCEDURE — 93798 PHYS/QHP OP CAR RHAB W/ECG: CPT

## 2024-05-15 ENCOUNTER — APPOINTMENT (OUTPATIENT)
Dept: CARDIAC REHAB | Facility: HOSPITAL | Age: 69
End: 2024-05-15
Attending: INTERNAL MEDICINE
Payer: MEDICARE

## 2024-05-16 ENCOUNTER — APPOINTMENT (OUTPATIENT)
Dept: CARDIAC REHAB | Facility: HOSPITAL | Age: 69
End: 2024-05-16
Attending: INTERNAL MEDICINE
Payer: MEDICARE

## 2024-05-16 PROCEDURE — 93798 PHYS/QHP OP CAR RHAB W/ECG: CPT

## 2024-05-17 ENCOUNTER — APPOINTMENT (OUTPATIENT)
Dept: CARDIAC REHAB | Facility: HOSPITAL | Age: 69
End: 2024-05-17
Attending: INTERNAL MEDICINE
Payer: MEDICARE

## 2024-05-20 ENCOUNTER — APPOINTMENT (OUTPATIENT)
Dept: CARDIAC REHAB | Facility: HOSPITAL | Age: 69
End: 2024-05-20
Attending: INTERNAL MEDICINE
Payer: MEDICARE

## 2024-05-21 ENCOUNTER — APPOINTMENT (OUTPATIENT)
Dept: CARDIAC REHAB | Facility: HOSPITAL | Age: 69
End: 2024-05-21
Attending: INTERNAL MEDICINE
Payer: MEDICARE

## 2024-05-21 PROCEDURE — 93798 PHYS/QHP OP CAR RHAB W/ECG: CPT

## 2024-05-22 ENCOUNTER — APPOINTMENT (OUTPATIENT)
Dept: CARDIAC REHAB | Facility: HOSPITAL | Age: 69
End: 2024-05-22
Attending: INTERNAL MEDICINE
Payer: MEDICARE

## 2024-05-23 ENCOUNTER — CARDPULM VISIT (OUTPATIENT)
Dept: CARDIAC REHAB | Facility: HOSPITAL | Age: 69
End: 2024-05-23
Attending: INTERNAL MEDICINE
Payer: MEDICARE

## 2024-05-23 PROCEDURE — 93798 PHYS/QHP OP CAR RHAB W/ECG: CPT

## 2024-05-24 ENCOUNTER — APPOINTMENT (OUTPATIENT)
Dept: CARDIAC REHAB | Facility: HOSPITAL | Age: 69
End: 2024-05-24
Attending: INTERNAL MEDICINE
Payer: MEDICARE

## 2024-05-27 ENCOUNTER — APPOINTMENT (OUTPATIENT)
Dept: CARDIAC REHAB | Facility: HOSPITAL | Age: 69
End: 2024-05-27
Attending: INTERNAL MEDICINE
Payer: MEDICARE

## 2024-05-28 ENCOUNTER — CARDPULM VISIT (OUTPATIENT)
Dept: CARDIAC REHAB | Facility: HOSPITAL | Age: 69
End: 2024-05-28
Attending: INTERNAL MEDICINE
Payer: MEDICARE

## 2024-05-28 PROCEDURE — 93798 PHYS/QHP OP CAR RHAB W/ECG: CPT

## 2024-05-29 ENCOUNTER — APPOINTMENT (OUTPATIENT)
Dept: CARDIAC REHAB | Facility: HOSPITAL | Age: 69
End: 2024-05-29
Attending: INTERNAL MEDICINE
Payer: MEDICARE

## 2024-05-30 ENCOUNTER — APPOINTMENT (OUTPATIENT)
Dept: CARDIAC REHAB | Facility: HOSPITAL | Age: 69
End: 2024-05-30
Attending: INTERNAL MEDICINE
Payer: MEDICARE

## 2024-05-30 PROCEDURE — 93798 PHYS/QHP OP CAR RHAB W/ECG: CPT

## 2024-05-31 ENCOUNTER — APPOINTMENT (OUTPATIENT)
Dept: CARDIAC REHAB | Facility: HOSPITAL | Age: 69
End: 2024-05-31
Attending: INTERNAL MEDICINE
Payer: MEDICARE

## 2024-06-03 ENCOUNTER — APPOINTMENT (OUTPATIENT)
Dept: CARDIAC REHAB | Facility: HOSPITAL | Age: 69
End: 2024-06-03
Attending: INTERNAL MEDICINE
Payer: MEDICARE

## 2024-06-04 ENCOUNTER — APPOINTMENT (OUTPATIENT)
Dept: CARDIAC REHAB | Facility: HOSPITAL | Age: 69
End: 2024-06-04
Attending: INTERNAL MEDICINE
Payer: MEDICARE

## 2024-06-04 PROCEDURE — 93798 PHYS/QHP OP CAR RHAB W/ECG: CPT

## 2024-06-05 ENCOUNTER — APPOINTMENT (OUTPATIENT)
Dept: CARDIAC REHAB | Facility: HOSPITAL | Age: 69
End: 2024-06-05
Attending: INTERNAL MEDICINE
Payer: MEDICARE

## 2024-06-06 ENCOUNTER — CARDPULM VISIT (OUTPATIENT)
Dept: CARDIAC REHAB | Facility: HOSPITAL | Age: 69
End: 2024-06-06
Attending: INTERNAL MEDICINE
Payer: MEDICARE

## 2024-06-06 PROCEDURE — 93798 PHYS/QHP OP CAR RHAB W/ECG: CPT

## 2024-06-07 ENCOUNTER — APPOINTMENT (OUTPATIENT)
Dept: CARDIAC REHAB | Facility: HOSPITAL | Age: 69
End: 2024-06-07
Attending: INTERNAL MEDICINE
Payer: MEDICARE

## 2024-06-10 ENCOUNTER — APPOINTMENT (OUTPATIENT)
Dept: CARDIAC REHAB | Facility: HOSPITAL | Age: 69
End: 2024-06-10
Attending: INTERNAL MEDICINE
Payer: MEDICARE

## 2024-06-11 ENCOUNTER — APPOINTMENT (OUTPATIENT)
Dept: CARDIAC REHAB | Facility: HOSPITAL | Age: 69
End: 2024-06-11
Attending: INTERNAL MEDICINE
Payer: MEDICARE

## 2024-06-12 ENCOUNTER — APPOINTMENT (OUTPATIENT)
Dept: CARDIAC REHAB | Facility: HOSPITAL | Age: 69
End: 2024-06-12
Attending: INTERNAL MEDICINE
Payer: MEDICARE

## 2024-06-13 ENCOUNTER — APPOINTMENT (OUTPATIENT)
Dept: CARDIAC REHAB | Facility: HOSPITAL | Age: 69
End: 2024-06-13
Attending: INTERNAL MEDICINE
Payer: MEDICARE

## 2024-06-14 ENCOUNTER — APPOINTMENT (OUTPATIENT)
Dept: CARDIAC REHAB | Facility: HOSPITAL | Age: 69
End: 2024-06-14
Attending: INTERNAL MEDICINE
Payer: MEDICARE

## 2024-06-17 ENCOUNTER — APPOINTMENT (OUTPATIENT)
Dept: CARDIAC REHAB | Facility: HOSPITAL | Age: 69
End: 2024-06-17
Attending: INTERNAL MEDICINE
Payer: MEDICARE

## 2024-06-17 ENCOUNTER — OFFICE VISIT (OUTPATIENT)
Dept: FAMILY MEDICINE CLINIC | Facility: CLINIC | Age: 69
End: 2024-06-17
Payer: MEDICARE

## 2024-06-17 VITALS
SYSTOLIC BLOOD PRESSURE: 112 MMHG | BODY MASS INDEX: 26.36 KG/M2 | DIASTOLIC BLOOD PRESSURE: 68 MMHG | HEIGHT: 66 IN | OXYGEN SATURATION: 98 % | WEIGHT: 164 LBS | RESPIRATION RATE: 20 BRPM | HEART RATE: 69 BPM

## 2024-06-17 DIAGNOSIS — E78.2 MIXED HYPERLIPIDEMIA: ICD-10-CM

## 2024-06-17 DIAGNOSIS — Z12.5 PROSTATE CANCER SCREENING: ICD-10-CM

## 2024-06-17 DIAGNOSIS — R73.9 HYPERGLYCEMIA: ICD-10-CM

## 2024-06-17 DIAGNOSIS — I21.3 ST ELEVATION MYOCARDIAL INFARCTION (STEMI), UNSPECIFIED ARTERY (HCC): ICD-10-CM

## 2024-06-17 DIAGNOSIS — E55.9 VITAMIN D DEFICIENCY: ICD-10-CM

## 2024-06-17 DIAGNOSIS — Z00.00 ENCOUNTER FOR ANNUAL HEALTH EXAMINATION: ICD-10-CM

## 2024-06-17 DIAGNOSIS — D64.9 ANEMIA, UNSPECIFIED TYPE: ICD-10-CM

## 2024-06-17 DIAGNOSIS — Z00.00 MEDICARE ANNUAL WELLNESS VISIT, SUBSEQUENT: Primary | ICD-10-CM

## 2024-06-17 PROCEDURE — G0009 ADMIN PNEUMOCOCCAL VACCINE: HCPCS | Performed by: FAMILY MEDICINE

## 2024-06-17 PROCEDURE — G0438 PPPS, INITIAL VISIT: HCPCS | Performed by: FAMILY MEDICINE

## 2024-06-17 PROCEDURE — 90677 PCV20 VACCINE IM: CPT | Performed by: FAMILY MEDICINE

## 2024-06-17 NOTE — PATIENT INSTRUCTIONS
PHARMACY FOR SHINGLES AND RSV VACCINES  Glenn Torres's SCREENING SCHEDULE   Tests on this list are recommended by your physician but may not be covered, or covered at this frequency, by your insurer.   Please check with your insurance carrier before scheduling to verify coverage.   PREVENTATIVE SERVICES FREQUENCY &  COVERAGE DETAILS LAST COMPLETION DATE   Diabetes Screening    Fasting Blood Sugar / Glucose    One screening every 12 months if never tested or if previously tested but not diagnosed with pre-diabetes   One screening every 6 months if diagnosed with pre-diabetes Lab Results   Component Value Date    GLU 88 04/05/2024        Cardiovascular Disease Screening    Lipid Panel  Cholesterol  Lipoprotein (HDL)  Triglycerides Covered every 5 years for all Medicare beneficiaries without apparent signs or symptoms of cardiovascular disease Lab Results   Component Value Date    CHOLEST 210 (H) 04/02/2024    HDL 62 (H) 04/02/2024     (H) 04/02/2024    TRIG 190 (H) 04/02/2024         Electrocardiogram (EKG)   Covered if needed at Welcome to Medicare, and non-screening if indicated for medical reasons 04/03/2024      Ultrasound Screening for Abdominal Aortic Aneurysm (AAA) Covered once in a lifetime for one of the following risk factors   • Men who are 65-75 years old and have ever smoked   • Anyone with a family history -     Colorectal Cancer Screening  Covered for ages 50-85; only need ONE of the following:    Colonoscopy   Covered every 10 years    Covered every 2 years if patient is at high risk or previous colonoscopy was abnormal 09/22/2020    Health Maintenance   Topic Date Due   • Colorectal Cancer Screening  09/22/2027       Flexible Sigmoidoscopy   Covered every 4 years -    Fecal Occult Blood Test Covered annually -   Prostate Cancer Screening    Prostate-Specific Antigen (PSA) Annually No results found for: \"PSA\"  Health Maintenance   Topic Date Due   • PSA  07/12/2024      Immunizations     Influenza Covered once per flu season  Please get every year -  No recommendations at this time    Pneumococcal Each vaccine ([id number] & [id number]) covered once after 65 Prevnar 13: 01/07/2021    [id number]: -     Pneumococcal Vaccination(2 of 2 - PPSV23 or PCV20) due on 03/04/2021    Hepatitis B One screening covered for patients with certain risk factors   -  No recommendations at this time    Tetanus Toxoid Not covered by Medicare Part B unless medically necessary (cut with metal); may be covered with your pharmacy prescription benefits -    Tetanus, Diptheria and Pertusis TD and TDaP Not covered by Medicare Part B -  No recommendations at this time    Zoster Not covered by Medicare Part B; may be covered with your pharmacy  prescription benefits 07/05/2016  Zoster Vaccines(2 of 3) due on 08/30/2016

## 2024-06-17 NOTE — PROGRESS NOTES
Subjective:   Glenn Torres is a 68 year old male who presents for a Medicare Subsequent Annual Wellness visit (Pt already had Initial Annual Wellness) and scheduled follow up of multiple significant but stable problems.       History/Other:   Fall Risk Assessment:   He has been screened for Falls and is low risk.      Cognitive Assessment:   He had a completely normal cognitive assessment - see flowsheet entries     Functional Ability/Status:   Glenn Torres has a completely normal functional assessment. See flowsheet for details.      Depression Screening (PHQ-2/PHQ-9): PHQ-2 SCORE: 0  , done 6/11/2024        5 minutes spent screening and counseling for depression    Advanced Directives:   He does have a Living Will but we do NOT have it on file in Epic.    He does have a POA but we do NOT have it on file in Epic.    Discussed Advance Care Planning with patient (and family/surrogate if present). Standard forms made available to patient in After Visit Summary.      Patient Active Problem List   Diagnosis    Visual disturbance    Age-related nuclear cataract of both eyes    Acute ST elevation myocardial infarction (STEMI), unspecified artery (HCC)     Allergies:  He has No Known Allergies.    Current Medications:  Outpatient Medications Marked as Taking for the 6/17/24 encounter (Office Visit) with Jose Croft, DO   Medication Sig    aspirin 81 MG Oral Tab EC Take 1 tablet (81 mg total) by mouth daily.    atorvastatin 80 MG Oral Tab Take 1 tablet (80 mg total) by mouth nightly.    metoprolol tartrate 25 MG Oral Tab Take 1 tablet (25 mg total) by mouth 2x Daily(Beta Blocker).    ticagrelor 90 MG Oral Tab Take 1 tablet (90 mg total) by mouth every 12 (twelve) hours.    B Complex Oral Tab 1 tablet daily    Garlic-DHA-EPA (GARLIC/EPA OR) garlic       Medical History:  He  has a past medical history of Back pain (04/05/2007) and Hyperlipidemia.  Surgical History:  He  has a past surgical history that includes  colonoscopy (09/2020); colonoscopy (N/A, 9/22/2020); and arthroscopy, shoulder, surgi (Right, 04/02/2008).   Family History:  His family history includes Heart Disorder (age of onset: 65) in his brother and sister; Heart Disorder (age of onset: 83) in his mother; Stroke (age of onset: 62) in his brother; Tumor (age of onset: 75) in his father.  Social History:  He  reports that he has never smoked. He has never used smokeless tobacco. He reports current alcohol use of about 1.0 standard drink of alcohol per week. He reports that he does not use drugs.    Tobacco:  He has never smoked tobacco.    CAGE Alcohol Screen:   He has been screened for alcohol abuse and his score is not 0:  Cut: Have you ever felt you should Cut down on your drinking?: Yes  Annoyed: Have people Annoyed you by criticizing your drinking?: Yes  Guilty: Have you ever felt bad or Guilty about your drinking?: Yes  Eye Opener: Have you ever had a drink first thing in the morning to steady your nerves or to get rid of a hangover (Eye opener)?: Yes  Total Score: 4      Patient Care Team:  Jose Croft DO as PCP - General (Family Medicine)    Review of Systems  GENERAL: feels well otherwise  SKIN: denies any unusual skin lesions  EYES: denies blurred vision or double vision  HEENT: denies nasal congestion, sinus pain or ST  LUNGS: denies shortness of breath with exertion  CARDIOVASCULAR: denies chest pain on exertion  GI: denies abdominal pain, denies heartburn  : 1 per night nocturia, no complaint of urinary incontinence  MUSCULOSKELETAL: denies back pain  NEURO: denies headaches  PSYCHE: denies depression or anxiety  HEMATOLOGIC: denies hx of anemia  ENDOCRINE: denies thyroid history  ALL/ASTHMA: denies hx of allergy or asthma    Objective:   Physical Exam  General Appearance:  Alert, cooperative, no distress, appears stated age   Head:  Normocephalic, without obvious abnormality, atraumatic   Eyes:  PERRL, conjunctiva/corneas clear, EOM's  intact, both eyes   Ears:  Normal TM's and external ear canals, both ears   Nose: Nares normal, septum midline, mucosa normal, no drainage or sinus tenderness   Throat: Lips, mucosa, and tongue normal; teeth and gums normal   Neck: Supple, symmetrical, trachea midline, no adenopathy, thyroid: not enlarged, symmetric, no tenderness/mass/nodules, no carotid bruit or JVD   Back:   Symmetric, no curvature, ROM normal, no CVA tenderness   Lungs:   Clear to auscultation bilaterally, respirations unlabored   Chest Wall:  No tenderness or deformity   Heart:  Regular rate and rhythm, S1, S2 normal, no murmur, rub or gallop   Abdomen:   Soft, non-tender, bowel sounds active all four quadrants,  no masses, no organomegaly   Genitalia: Normal male   Rectal: Normal tone, normal prostate, no masses or tenderness   Extremities: Extremities normal, atraumatic, no cyanosis or edema   Pulses: 2+ and symmetric   Skin: Skin color, texture, turgor normal, no rashes or lesions   Lymph nodes: Cervical, supraclavicular, and axillary nodes normal   Neurologic: Normal     /68 (BP Location: Right arm, Patient Position: Sitting, Cuff Size: large)   Pulse 69   Resp 20   Ht 5' 6\" (1.676 m)   Wt 164 lb (74.4 kg)   SpO2 98%   BMI 26.47 kg/m²  Estimated body mass index is 26.47 kg/m² as calculated from the following:    Height as of this encounter: 5' 6\" (1.676 m).    Weight as of this encounter: 164 lb (74.4 kg).    Medicare Hearing Assessment:   Hearing Screening    Time taken: 6/17/2024 11:31 AM  Screening Method: Finger Rub  Finger Rub Result: Pass         Visual Acuity:   Right Eye Visual Acuity: Uncorrected Right Eye Chart Acuity: 20/25   Left Eye Visual Acuity: Uncorrected Left Eye Chart Acuity: 20/25   Both Eyes Visual Acuity: Uncorrected Both Eyes Chart Acuity: 20/25   Able To Tolerate Visual Acuity: Yes        Assessment & Plan:   Glenn Torres is a 68 year old male who presents for a Medicare Assessment.     1. Medicare  annual wellness visit, subsequent (Primary)  -     EKG 12 Lead; Future; Expected date: 06/17/2024  -     OPHTHALMOLOGY - INTERNAL  2. ST elevation myocardial infarction (STEMI), unspecified artery (HCC)  3. Mixed hyperlipidemia  -     PSA Total, Screen; Future; Expected date: 06/17/2024  -     Comp Metabolic Panel (14); Future; Expected date: 06/17/2024  -     Lipid Panel; Future; Expected date: 06/17/2024  -     TSH W Reflex To Free T4; Future; Expected date: 06/17/2024  -     Hemoglobin A1C; Future; Expected date: 06/17/2024  4. Anemia, unspecified type  5. Vitamin D deficiency  6. Prostate cancer screening  -     PSA Total, Screen; Future; Expected date: 06/17/2024  7. Hyperglycemia  -     PSA Total, Screen; Future; Expected date: 06/17/2024  -     Comp Metabolic Panel (14); Future; Expected date: 06/17/2024  -     Lipid Panel; Future; Expected date: 06/17/2024  -     TSH W Reflex To Free T4; Future; Expected date: 06/17/2024  -     Hemoglobin A1C; Future; Expected date: 06/17/2024  Other orders  -     Prevnar 20 (PCV20) [44905]    The patient indicates understanding of these issues and agrees to the plan.  Consult ordered.  Reinforced healthy diet, lifestyle, and exercise.      No follow-ups on file.     Jose Croft DO, 6/17/2024     Supplementary Documentation:   General Health:  In the past six months, have you lost more than 10 pounds without trying?: 2 - No  Has your appetite been poor?: No  Type of Diet: Balanced;Low Salt  How does the patient maintain a good energy level?: Appropriate Exercise;Daily Walks;Stretching  How would you describe your daily physical activity?: Moderate  How would you describe your current health state?: Good  How do you maintain positive mental well-being?: Social Interaction;Games;Visiting Friends;Visiting Family  On a scale of 0 to 10, with 0 being no pain and 10 being severe pain, what is your pain level?: 1 - (Mild)  In the past six months, have you experienced urine  leakage?: 0-No  At any time do you feel concerned for the safety/well-being of yourself and/or your children, in your home or elsewhere?: No  Have you had any immunizations at another office such as Influenza, Hepatitis B, Tetanus, or Pneumococcal?: Dior Torres's SCREENING SCHEDULE   Tests on this list are recommended by your physician but may not be covered, or covered at this frequency, by your insurer.   Please check with your insurance carrier before scheduling to verify coverage.   PREVENTATIVE SERVICES FREQUENCY &  COVERAGE DETAILS LAST COMPLETION DATE   Diabetes Screening    Fasting Blood Sugar / Glucose    One screening every 12 months if never tested or if previously tested but not diagnosed with pre-diabetes   One screening every 6 months if diagnosed with pre-diabetes Lab Results   Component Value Date    GLU 88 04/05/2024        Cardiovascular Disease Screening    Lipid Panel  Cholesterol  Lipoprotein (HDL)  Triglycerides Covered every 5 years for all Medicare beneficiaries without apparent signs or symptoms of cardiovascular disease Lab Results   Component Value Date    CHOLEST 210 (H) 04/02/2024    HDL 62 (H) 04/02/2024     (H) 04/02/2024    TRIG 190 (H) 04/02/2024         Electrocardiogram (EKG)   Covered if needed at Welcome to Medicare, and non-screening if indicated for medical reasons 04/03/2024      Ultrasound Screening for Abdominal Aortic Aneurysm (AAA) Covered once in a lifetime for one of the following risk factors    Men who are 65-75 years old and have ever smoked    Anyone with a family history -     Colorectal Cancer Screening  Covered for ages 50-85; only need ONE of the following:    Colonoscopy   Covered every 10 years    Covered every 2 years if patient is at high risk or previous colonoscopy was abnormal 09/22/2020    Health Maintenance   Topic Date Due    Colorectal Cancer Screening  09/22/2027       Flexible Sigmoidoscopy   Covered every 4 years -    Fecal  Occult Blood Test Covered annually -   Prostate Cancer Screening    Prostate-Specific Antigen (PSA) Annually No results found for: \"PSA\"  Health Maintenance   Topic Date Due    PSA  07/12/2024      Immunizations    Influenza Covered once per flu season  Please get every year -  No recommendations at this time    Pneumococcal Each vaccine (Zdwfesr23 & [id number]) covered once after 65 Prevnar 13: 01/07/2021    [id number]: -     Pneumococcal Vaccination(2 of 2 - PPSV23 or PCV20) due on 03/04/2021    Hepatitis B One screening covered for patients with certain risk factors   -  No recommendations at this time    Tetanus Toxoid Not covered by Medicare Part B unless medically necessary (cut with metal); may be covered with your pharmacy prescription benefits -    Tetanus, Diptheria and Pertusis TD and TDaP Not covered by Medicare Part B -  No recommendations at this time    Zoster Not covered by Medicare Part B; may be covered with your pharmacy  prescription benefits 07/05/2016  Zoster Vaccines(2 of 3) due on 08/30/2016       1. Medicare annual wellness visit, subsequent  Pharmacy for vaccines  - EKG 12 Lead; Future  - OPHTHALMOLOGY - INTERNAL    2. ST elevation myocardial infarction (STEMI), unspecified artery (HCC)  Occurred just over 2 months ago has follow-up scheduled with cardiology atorvastatin and ticagrelor    3. Mixed hyperlipidemia  Atorvastatin  - PSA Total, Screen; Future  - Comp Metabolic Panel (14); Future  - Lipid Panel; Future  - TSH W Reflex To Free T4; Future  - Hemoglobin A1C; Future    4. Anemia, unspecified type  Improved during hospital stay    5. Vitamin D deficiency  Will follow    6. Prostate cancer screening  Lab ordered  - PSA Total, Screen; Future    7. Hyperglycemia  Lab ordered  - PSA Total, Screen; Future  - Comp Metabolic Panel (14); Future  - Lipid Panel; Future  - TSH W Reflex To Free T4; Future  - Hemoglobin A1C; Future

## 2024-06-18 ENCOUNTER — CARDPULM VISIT (OUTPATIENT)
Dept: CARDIAC REHAB | Facility: HOSPITAL | Age: 69
End: 2024-06-18
Attending: INTERNAL MEDICINE
Payer: MEDICARE

## 2024-06-18 PROCEDURE — 93798 PHYS/QHP OP CAR RHAB W/ECG: CPT

## 2024-06-19 ENCOUNTER — APPOINTMENT (OUTPATIENT)
Dept: CARDIAC REHAB | Facility: HOSPITAL | Age: 69
End: 2024-06-19
Attending: INTERNAL MEDICINE
Payer: MEDICARE

## 2024-06-20 ENCOUNTER — CARDPULM VISIT (OUTPATIENT)
Dept: CARDIAC REHAB | Facility: HOSPITAL | Age: 69
End: 2024-06-20
Attending: INTERNAL MEDICINE
Payer: MEDICARE

## 2024-06-20 PROCEDURE — 93798 PHYS/QHP OP CAR RHAB W/ECG: CPT

## 2024-06-21 ENCOUNTER — LAB ENCOUNTER (OUTPATIENT)
Dept: LAB | Age: 69
End: 2024-06-21
Attending: FAMILY MEDICINE

## 2024-06-21 ENCOUNTER — APPOINTMENT (OUTPATIENT)
Dept: CARDIAC REHAB | Facility: HOSPITAL | Age: 69
End: 2024-06-21
Attending: INTERNAL MEDICINE
Payer: MEDICARE

## 2024-06-21 ENCOUNTER — EKG ENCOUNTER (OUTPATIENT)
Dept: LAB | Age: 69
End: 2024-06-21
Attending: FAMILY MEDICINE

## 2024-06-21 DIAGNOSIS — E78.2 MIXED HYPERLIPIDEMIA: ICD-10-CM

## 2024-06-21 DIAGNOSIS — Z00.00 MEDICARE ANNUAL WELLNESS VISIT, SUBSEQUENT: ICD-10-CM

## 2024-06-21 DIAGNOSIS — Z12.5 PROSTATE CANCER SCREENING: ICD-10-CM

## 2024-06-21 DIAGNOSIS — R74.01 ELEVATED TRANSAMINASE LEVEL: ICD-10-CM

## 2024-06-21 DIAGNOSIS — R73.9 HYPERGLYCEMIA: ICD-10-CM

## 2024-06-21 LAB
ALBUMIN SERPL-MCNC: 4.4 G/DL (ref 3.2–4.8)
ALBUMIN/GLOB SERPL: 1.5 {RATIO} (ref 1–2)
ALP LIVER SERPL-CCNC: 176 U/L
ALT SERPL-CCNC: 93 U/L
ANION GAP SERPL CALC-SCNC: 6 MMOL/L (ref 0–18)
AST SERPL-CCNC: 71 U/L (ref ?–34)
ATRIAL RATE: 58 BPM
BILIRUB SERPL-MCNC: 1.2 MG/DL (ref 0.2–1.1)
BUN BLD-MCNC: 15 MG/DL (ref 9–23)
BUN/CREAT SERPL: 15.5 (ref 10–20)
CALCIUM BLD-MCNC: 9.7 MG/DL (ref 8.7–10.4)
CHLORIDE SERPL-SCNC: 109 MMOL/L (ref 98–112)
CHOLEST SERPL-MCNC: 120 MG/DL (ref ?–200)
CO2 SERPL-SCNC: 27 MMOL/L (ref 21–32)
COMPLEXED PSA SERPL-MCNC: 0.63 NG/ML (ref ?–4)
CREAT BLD-MCNC: 0.97 MG/DL
DEPRECATED HBV CORE AB SER IA-ACNC: 250.8 NG/ML
EGFRCR SERPLBLD CKD-EPI 2021: 85 ML/MIN/1.73M2 (ref 60–?)
EST. AVERAGE GLUCOSE BLD GHB EST-MCNC: 117 MG/DL (ref 68–126)
FASTING PATIENT LIPID ANSWER: YES
FASTING STATUS PATIENT QL REPORTED: YES
GLOBULIN PLAS-MCNC: 3 G/DL (ref 2–3.5)
GLUCOSE BLD-MCNC: 91 MG/DL (ref 70–99)
HAV AB SER QL IA: REACTIVE
HAV IGM SER QL: NONREACTIVE
HBA1C MFR BLD: 5.7 % (ref ?–5.7)
HBV CORE AB SERPL QL IA: NONREACTIVE
HBV SURFACE AB SER QL: REACTIVE
HBV SURFACE AB SERPL IA-ACNC: 118.13 MIU/ML
HBV SURFACE AG SERPL QL IA: NONREACTIVE
HCV AB SERPL QL IA: NONREACTIVE
HDLC SERPL-MCNC: 64 MG/DL (ref 40–59)
IRON SATN MFR SERPL: 31 %
IRON SERPL-MCNC: 119 UG/DL
LDLC SERPL CALC-MCNC: 45 MG/DL (ref ?–100)
NONHDLC SERPL-MCNC: 56 MG/DL (ref ?–130)
OSMOLALITY SERPL CALC.SUM OF ELEC: 294 MOSM/KG (ref 275–295)
P AXIS: 72 DEGREES
P-R INTERVAL: 144 MS
POTASSIUM SERPL-SCNC: 5 MMOL/L (ref 3.5–5.1)
PROT SERPL-MCNC: 7.4 G/DL (ref 5.7–8.2)
Q-T INTERVAL: 420 MS
QRS DURATION: 70 MS
QTC CALCULATION (BEZET): 412 MS
R AXIS: 23 DEGREES
SODIUM SERPL-SCNC: 142 MMOL/L (ref 136–145)
T AXIS: 49 DEGREES
TIBC SERPL-MCNC: 390 UG/DL (ref 250–425)
TRANSFERRIN SERPL-MCNC: 262 MG/DL (ref 215–365)
TRIGL SERPL-MCNC: 46 MG/DL (ref 30–149)
TSI SER-ACNC: 1.39 MIU/ML (ref 0.55–4.78)
VENTRICULAR RATE: 58 BPM
VLDLC SERPL CALC-MCNC: 6 MG/DL (ref 0–30)

## 2024-06-21 PROCEDURE — 82728 ASSAY OF FERRITIN: CPT

## 2024-06-21 PROCEDURE — 80503 PATH CLIN CONSLTJ SF 5-20: CPT

## 2024-06-21 PROCEDURE — 86708 HEPATITIS A ANTIBODY: CPT

## 2024-06-21 PROCEDURE — 84466 ASSAY OF TRANSFERRIN: CPT

## 2024-06-21 PROCEDURE — 93005 ELECTROCARDIOGRAM TRACING: CPT

## 2024-06-21 PROCEDURE — 83036 HEMOGLOBIN GLYCOSYLATED A1C: CPT

## 2024-06-21 PROCEDURE — 86706 HEP B SURFACE ANTIBODY: CPT

## 2024-06-21 PROCEDURE — 84443 ASSAY THYROID STIM HORMONE: CPT

## 2024-06-21 PROCEDURE — 93010 ELECTROCARDIOGRAM REPORT: CPT | Performed by: INTERNAL MEDICINE

## 2024-06-21 PROCEDURE — 87340 HEPATITIS B SURFACE AG IA: CPT

## 2024-06-21 PROCEDURE — 86704 HEP B CORE ANTIBODY TOTAL: CPT

## 2024-06-21 PROCEDURE — 86803 HEPATITIS C AB TEST: CPT

## 2024-06-21 PROCEDURE — 83540 ASSAY OF IRON: CPT

## 2024-06-21 PROCEDURE — 80061 LIPID PANEL: CPT

## 2024-06-21 PROCEDURE — 80053 COMPREHEN METABOLIC PANEL: CPT

## 2024-06-21 PROCEDURE — 86709 HEPATITIS A IGM ANTIBODY: CPT

## 2024-06-21 PROCEDURE — 36415 COLL VENOUS BLD VENIPUNCTURE: CPT

## 2024-06-24 ENCOUNTER — APPOINTMENT (OUTPATIENT)
Dept: CARDIAC REHAB | Facility: HOSPITAL | Age: 69
End: 2024-06-24
Attending: INTERNAL MEDICINE
Payer: MEDICARE

## 2024-06-25 ENCOUNTER — APPOINTMENT (OUTPATIENT)
Dept: CARDIAC REHAB | Facility: HOSPITAL | Age: 69
End: 2024-06-25
Attending: INTERNAL MEDICINE
Payer: MEDICARE

## 2024-06-25 PROCEDURE — 93798 PHYS/QHP OP CAR RHAB W/ECG: CPT

## 2024-06-26 ENCOUNTER — APPOINTMENT (OUTPATIENT)
Dept: CARDIAC REHAB | Facility: HOSPITAL | Age: 69
End: 2024-06-26
Attending: INTERNAL MEDICINE
Payer: MEDICARE

## 2024-06-27 ENCOUNTER — APPOINTMENT (OUTPATIENT)
Dept: CARDIAC REHAB | Facility: HOSPITAL | Age: 69
End: 2024-06-27
Attending: INTERNAL MEDICINE
Payer: MEDICARE

## 2024-06-27 PROCEDURE — 93798 PHYS/QHP OP CAR RHAB W/ECG: CPT

## 2024-06-28 ENCOUNTER — APPOINTMENT (OUTPATIENT)
Dept: CARDIAC REHAB | Facility: HOSPITAL | Age: 69
End: 2024-06-28
Attending: INTERNAL MEDICINE
Payer: MEDICARE

## 2024-06-29 ENCOUNTER — NURSE TRIAGE (OUTPATIENT)
Dept: FAMILY MEDICINE CLINIC | Facility: CLINIC | Age: 69
End: 2024-06-29

## 2024-06-29 ENCOUNTER — HOSPITAL ENCOUNTER (OUTPATIENT)
Age: 69
Discharge: HOME | End: 2024-06-29
Payer: MEDICARE

## 2024-06-29 VITALS
RESPIRATION RATE: 18 BRPM | HEART RATE: 62 BPM | OXYGEN SATURATION: 99 % | DIASTOLIC BLOOD PRESSURE: 70 MMHG | SYSTOLIC BLOOD PRESSURE: 120 MMHG | TEMPERATURE: 98 F

## 2024-06-29 DIAGNOSIS — K92.1 BLOOD IN STOOL: ICD-10-CM

## 2024-06-29 DIAGNOSIS — K64.9 HEMORRHOIDS, UNSPECIFIED HEMORRHOID TYPE: ICD-10-CM

## 2024-06-29 DIAGNOSIS — Z79.01 ANTICOAGULATED: Primary | ICD-10-CM

## 2024-06-29 LAB
#MXD IC: 0.6 X10ˆ3/UL (ref 0.1–1)
BUN BLD-MCNC: 16 MG/DL (ref 7–18)
CHLORIDE BLD-SCNC: 106 MMOL/L (ref 98–112)
CO2 BLD-SCNC: 26 MMOL/L (ref 21–32)
CREAT BLD-MCNC: 1 MG/DL
EGFRCR SERPLBLD CKD-EPI 2021: 82 ML/MIN/1.73M2 (ref 60–?)
GLUCOSE BLD-MCNC: 99 MG/DL (ref 70–99)
HCT VFR BLD AUTO: 39.5 %
HCT VFR BLD CALC: 42 %
HGB BLD-MCNC: 13.2 G/DL
ISTAT IONIZED CALCIUM FOR CHEM 8: 1.25 MMOL/L (ref 1.12–1.32)
LYMPHOCYTES # BLD AUTO: 1.6 X10ˆ3/UL (ref 1–4)
LYMPHOCYTES NFR BLD AUTO: 31.6 %
MCH RBC QN AUTO: 33.2 PG (ref 26–34)
MCHC RBC AUTO-ENTMCNC: 33.4 G/DL (ref 31–37)
MCV RBC AUTO: 99.5 FL (ref 80–100)
MIXED CELL %: 11.5 %
NEUTROPHILS # BLD AUTO: 3 X10ˆ3/UL (ref 1.5–7.7)
NEUTROPHILS NFR BLD AUTO: 56.9 %
PLATELET # BLD AUTO: 183 X10ˆ3/UL (ref 150–450)
POTASSIUM BLD-SCNC: 4.6 MMOL/L (ref 3.6–5.1)
RBC # BLD AUTO: 3.97 X10ˆ6/UL
SODIUM BLD-SCNC: 140 MMOL/L (ref 136–145)
WBC # BLD AUTO: 5.2 X10ˆ3/UL (ref 4–11)

## 2024-06-29 PROCEDURE — 36415 COLL VENOUS BLD VENIPUNCTURE: CPT

## 2024-06-29 PROCEDURE — 80047 BASIC METABLC PNL IONIZED CA: CPT

## 2024-06-29 PROCEDURE — 85025 COMPLETE CBC W/AUTO DIFF WBC: CPT | Performed by: NURSE PRACTITIONER

## 2024-06-29 PROCEDURE — 99214 OFFICE O/P EST MOD 30 MIN: CPT

## 2024-06-29 PROCEDURE — 99213 OFFICE O/P EST LOW 20 MIN: CPT

## 2024-06-29 RX ORDER — DOCUSATE SODIUM 100 MG/1
100 CAPSULE, LIQUID FILLED ORAL 2 TIMES DAILY
Qty: 20 CAPSULE | Refills: 0 | Status: SHIPPED | OUTPATIENT
Start: 2024-06-29

## 2024-06-29 NOTE — TELEPHONE ENCOUNTER
Patient scheduled an appointment via [facility] for the following concern:    Blood in stool today at 7:43 pm. I stopped the Atorvastatin. I took one (1) Rosuvastatin last night.

## 2024-06-29 NOTE — ED INITIAL ASSESSMENT (HPI)
Presents with \"blood in stool\". 1 episode yesterday and 2nd episode today. Reports being constipated and straining at times. Denies abdominal pain. Possible hemorrhoid.

## 2024-06-29 NOTE — ED PROVIDER NOTES
Patient Seen in: [facility] [place]      History     Chief Complaint   Patient presents with    Blood In Stool     Stated Complaint: blood in stool    Subjective:   68-year-old male with previous MI with cardiac stents, taking Brilinta presents from home with complaint of blood in his stool.  2 episodes, 1 yesterday, 1 today.  States bright red blood streaks mixed with formed stool.  States he has chronic constipation after starting his medications Post MI.  States he strains on the toilet, he is not taking any medications for constipation.  He denies abdominal pain.  He does not believe that he has hernia.  Denies any recent unintentional weight loss.  Denies fatigue.  Last colonoscopy was about 10 years ago.    The history is provided by the patient. No  was used.       Objective:   Past Medical History:    Back pain    Hyperlipidemia            HISTORY:  Past Medical History:    Back pain    Hyperlipidemia      Past Surgical History:   Procedure Laterality Date    Arthroscopy, shoulder, surgi Right 04/02/2008 03/23/2009, 11/25/2009,     Colonoscopy  09/2020    Colonoscopy N/A 9/22/2020    Procedure: COLONOSCOPY, POSSIBLE BIOPSY, POSSIBLE POLYPECTOMY 67923;  Surgeon: Rivas Ledesma MD;  Location: [facility]      Family History   Problem Relation Age of Onset    Heart Disorder Mother 83        Heart Attack    Other (Tumor) Father 75        Brain Tumor    Heart Disorder Sister 65        [place] actual Dx name    Heart Disorder Brother 65        Quartupal Bypas    Stroke Brother 62    Diabetes Neg     Glaucoma Neg     Macular degeneration Neg       Social History     Socioeconomic History    Marital status:    Tobacco Use    Smoking status: Never    Smokeless tobacco: Never   Vaping Use    Vaping status: Never Used   Substance and Sexual Activity    Alcohol use: Yes     Alcohol/week: 1.0 standard drink of alcohol     Types: 1 Cans of beer per week     Comment: social     Drug use: Never     Social Determinants of Health     Financial Resource Strain: Low Risk  (4/9/2024)    Financial Resource Strain     Difficulty of Paying Living Expenses: Not hard at all     Med Affordability: No   Food Insecurity: No Food Insecurity (4/3/2024)    Food Insecurity     Food Insecurity: Never true   Transportation Needs: No Transportation Needs (4/9/2024)    Transportation Needs     Lack of Transportation: No   Housing Stability: Low Risk  (4/3/2024)    Housing Stability     Housing Instability: No          Past Surgical History:   Procedure Laterality Date    Arthroscopy, shoulder, surgi Right 04/02/2008 03/23/2009, 11/25/2009,     Colonoscopy  09/2020    Colonoscopy N/A 9/22/2020    Procedure: COLONOSCOPY, POSSIBLE BIOPSY, POSSIBLE POLYPECTOMY 15069;  Surgeon: Rivas Ledesma MD;  [facility]                Social History     Socioeconomic History    Marital status:    Tobacco Use    Smoking status: Never    Smokeless tobacco: Never   Vaping Use    Vaping status: Never Used   Substance and Sexual Activity    Alcohol use: Yes     Alcohol/week: 1.0 standard drink of alcohol     Types: 1 Cans of beer per week     Comment: social    Drug use: Never     Social Determinants of Health     Financial Resource Strain: Low Risk  (4/9/2024)    Financial Resource Strain     Difficulty of Paying Living Expenses: Not hard at all     Med Affordability: No   Food Insecurity: No Food Insecurity (4/3/2024)    Food Insecurity     Food Insecurity: Never true   Transportation Needs: No Transportation Needs (4/9/2024)    Transportation Needs     Lack of Transportation: No   Housing Stability: Low Risk  (4/3/2024)    Housing Stability     Housing Instability: No              Review of Systems    Positive for stated Chief Complaint: Blood In Stool    Other systems are as noted in HPI.  Constitutional and vital signs reviewed.      All other systems reviewed and negative except as noted  above.    Physical Exam     ED Triage Vitals [06/29/24 1358]   /70   Pulse 62   Resp 18   Temp 98.1 °F (36.7 °C)   Temp src Temporal   SpO2 99 %   O2 Device None (Room air)       Current Vitals:   Vital Signs  BP: 120/70  Pulse: 62  Resp: 18  Temp: 98.1 °F (36.7 °C)  Temp src: Temporal    Oxygen Therapy  SpO2: 99 %  O2 Device: None (Room air)            Physical Exam  Vitals and nursing note reviewed. Exam conducted with a chaperone present (Irene RN).   Constitutional:       General: He is not in acute distress.     Appearance: Normal appearance. He is not ill-appearing or toxic-appearing.   HENT:      Head: Normocephalic and atraumatic.      Nose: Nose normal.      Mouth/Throat:      Mouth: Mucous membranes are moist.      Pharynx: Oropharynx is clear.   Eyes:      Pupils: Pupils are equal, round, and reactive to light.   Cardiovascular:      Rate and Rhythm: Normal rate and regular rhythm.      Pulses: Normal pulses.   Pulmonary:      Effort: Pulmonary effort is normal. No respiratory distress.      Breath sounds: Normal breath sounds.      Comments: Lungs clear.  No adventitious lung sounds.  No distress.  No hypoxia.  Pulse ox 99% ra. Which is normal    Abdominal:      General: Abdomen is flat.      Palpations: Abdomen is soft.      Tenderness: There is no abdominal tenderness.   Genitourinary:     Rectum: Guaiac result negative. External hemorrhoid (large soft nontender, no bleeding) present. No tenderness. Normal anal tone.   Musculoskeletal:         General: No signs of injury. Normal range of motion.      Cervical back: Normal range of motion and neck supple.   Skin:     General: Skin is warm and dry.      Capillary Refill: Capillary refill takes less than 2 seconds.      Coloration: Skin is not pale.   Neurological:      General: No focal deficit present.      Mental Status: He is alert and oriented to person, place, and time.      GCS: GCS eye subscore is 4. GCS verbal subscore is 5. GCS motor  subscore is 6.   Psychiatric:         Mood and Affect: Mood normal.         Behavior: Behavior normal.         Thought Content: Thought content normal.         Judgment: Judgment normal.         ED Course     Labs Reviewed   POCT ISTAT CHEM8 CARTRIDGE - Normal   POCT CBC     Recent Results (from the past 24 hour(s))   POCT CBC    Collection Time: 06/29/24  1:59 PM   Result Value Ref Range    WBC IC 5.2 4.0 - 11.0 x10ˆ3/uL    RBC IC 3.97 3.80 - 5.80 X10ˆ6/uL    HGB IC 13.2 13.0 - 17.5 g/dL    HCT IC 39.5 39.0 - 53.0 %    MCV IC 99.5 80.0 - 100.0 fL    MCH IC 33.2 26.0 - 34.0 pg    MCHC IC 33.4 31.0 - 37.0 g/dL    PLT .0 150.0 - 450.0 X10ˆ3/uL    # Neutrophil 3.0 1.5 - 7.7 X10ˆ3/uL    # Lymphocyte 1.6 1.0 - 4.0 X10ˆ3/uL    # Mixed Cells 0.6 0.1 - 1.0 X10ˆ3/uL    Neutrophil % 56.9 %    Lymphocyte % 31.6 %    Mixed Cell % 11.5 %   POCT ISTAT chem8 cartridge    Collection Time: 06/29/24  2:06 PM   Result Value Ref Range    ISTAT Sodium 140 136 - 145 mmol/L    ISTAT BUN 16 7 - 18 mg/dL    ISTAT Potassium 4.6 3.6 - 5.1 mmol/L    ISTAT Chloride 106 98 - 112 mmol/L    ISTAT Ionized Calcium 1.25 1.12 - 1.32 mmol/L    ISTAT Hematocrit 42 37 - 53 %    ISTAT Glucose 99 70 - 99 mg/dL    ISTAT TCO2 26 21 - 32 mmol/L    ISTAT Creatinine 1.00 0.70 - 1.30 mg/dL    eGFR-Cr 82 >=60 mL/min/1.73m2       MDM        Medical Decision Making  Differential diagnoses reflecting the complexity of care include: Rectal bleeding, bleeding hemorrhoid, anemia, malignancy  Well appearing. No pallor. No hypotension or tachycardia. Abdomen soft and nontender. No distention  Guaiac negative on exam.  He does have a large soft nontender hemorrhoid, no active bleeding.  Blood work is reassuring.  Hemoglobin 13.2, no anemia.  Platelets 183.  Patient does admit to chronic constipation after starting medications in April.  No home remedies attempted.    Plan of Care: Docusate and Tucks pads.  Increase water intake.  Continue home medications.  Needs  close follow-up with his primary doctor.  Will need colonoscopy.  He was encouraged to go to the emergency room if he had worsening symptoms, persistent bleeding, abdominal pain, fatigue    The case was discussed with attending Dr Snell. They are in agreement with the plan of care.   .    Results and plan of care discussed with the patient/family. They are in agreement with discharge. They understand to follow up with their primary doctor or the referral physician for further evaluation, especially if no improvement.  Also discussed the limitations of immediate care, patient is aware that if symptoms are worse they should go to the emergency room. Verbal and written discharge instructions were given.     Comorbidities that add complexity to management include: MI, stents, anticoagulated  External chart review was done and was noted: ED visit for MI 4/24        Problems Addressed:  Anticoagulated: acute illness or injury  Blood in stool: acute illness or injury  Hemorrhoids, unspecified hemorrhoid type: acute illness or injury    Amount and/or Complexity of Data Reviewed  Labs: ordered. Decision-making details documented in ED Course.    Risk  OTC drugs.  Prescription drug management.        Disposition and Plan     Clinical Impression:  1. Anticoagulated    2. Blood in stool    3. Hemorrhoids, unspecified hemorrhoid type         Disposition:  Discharge  6/29/2024  2:12 pm    Follow-up:  Jose Croft, DO  [address] [place] [address]  [phone]                Medications Prescribed:  Discharge Medication List as of 6/29/2024  2:12 PM        START taking these medications    Details   docusate sodium 100 MG Oral Cap Take 1 capsule (100 mg total) by mouth 2 (two) times daily., Normal, Disp-20 capsule, R-0

## 2024-06-29 NOTE — DISCHARGE INSTRUCTIONS
Workup today is good. Use Tucks pads. Take the stool softener. Schedule follow up appointment with Dr. Croft.  Go to the emergency room if recurrent bleeding, abdominal pain, weakness

## 2024-06-29 NOTE — TELEPHONE ENCOUNTER
Action Requested: Summary for Provider     []  Critical Lab, Recommendations Needed  [] Need Additional Advice  []   FYI    []   Need Orders  [] Need Medications Sent to Pharmacy  []  Other     SUMMARY: patient contact, he had one episode of blood with or in stool last evening. Denies any abd pain, CP, SOB. No nausea/vomiting  Patient does not think he has hemorrhoids, not had a problem before like this. HE is not sure if stool had blood in it but when he wiped there was blood bright red and toilet water was pink.   No rectal pain. No BM since. Has been straining a bit to lately since starting meds in April. Increased water intake but he is on ASA Brilinta BID. Patient is having some general malaise and joint pain, Since April started on lovastatin.     Patient advised UC today for evaluation. Agrees to plan and will go to [place] location for care.       Reason for call: Blood In Stool  Onset: yesterday blood in stool            Reason for Disposition   Taking Coumadin (warfarin) or other strong blood thinner, or known bleeding disorder (e.g., thrombocytopenia)    Protocols used: Rectal Bleeding-A-OH

## 2024-07-01 ENCOUNTER — APPOINTMENT (OUTPATIENT)
Dept: CARDIAC REHAB | Facility: HOSPITAL | Age: 69
End: 2024-07-01
Attending: INTERNAL MEDICINE
Payer: MEDICARE

## 2024-07-02 ENCOUNTER — TELEMEDICINE (OUTPATIENT)
Dept: FAMILY MEDICINE CLINIC | Facility: CLINIC | Age: 69
End: 2024-07-02
Payer: MEDICARE

## 2024-07-02 ENCOUNTER — CARDPULM VISIT (OUTPATIENT)
Dept: CARDIAC REHAB | Facility: HOSPITAL | Age: 69
End: 2024-07-02
Attending: INTERNAL MEDICINE
Payer: MEDICARE

## 2024-07-02 DIAGNOSIS — K64.9 HEMORRHOIDS, UNSPECIFIED HEMORRHOID TYPE: Primary | ICD-10-CM

## 2024-07-02 PROCEDURE — 99212 OFFICE O/P EST SF 10 MIN: CPT | Performed by: FAMILY MEDICINE

## 2024-07-02 PROCEDURE — 93798 PHYS/QHP OP CAR RHAB W/ECG: CPT

## 2024-07-02 NOTE — PROGRESS NOTES
VIDEO VISIT               Following up for 2 episodes of bright red blood per rectum.  Went to urgent care where a hemorrhoid was found.  No episodes since then.  Patient has daily bowel movements denies diarrhea or vomiting.  Reports he currently feels well denies any rectal pain.  Some concern over his liver enzyme elevation.    Patient is comfortable no apparent distress on exam    Assessment hemorrhoid    History of elevated liver enzymes with liver ultrasound scheduled    Currently taking statin medication recent myocardial infarction    Will follow with results

## 2024-07-03 ENCOUNTER — APPOINTMENT (OUTPATIENT)
Dept: CARDIAC REHAB | Facility: HOSPITAL | Age: 69
End: 2024-07-03
Attending: INTERNAL MEDICINE
Payer: MEDICARE

## 2024-07-04 ENCOUNTER — APPOINTMENT (OUTPATIENT)
Dept: CARDIAC REHAB | Facility: HOSPITAL | Age: 69
End: 2024-07-04
Attending: INTERNAL MEDICINE
Payer: MEDICARE

## 2024-07-05 ENCOUNTER — APPOINTMENT (OUTPATIENT)
Dept: CARDIAC REHAB | Facility: HOSPITAL | Age: 69
End: 2024-07-05
Attending: INTERNAL MEDICINE
Payer: MEDICARE

## 2024-07-08 ENCOUNTER — APPOINTMENT (OUTPATIENT)
Dept: CARDIAC REHAB | Facility: HOSPITAL | Age: 69
End: 2024-07-08
Attending: INTERNAL MEDICINE
Payer: MEDICARE

## 2024-07-09 ENCOUNTER — CARDPULM VISIT (OUTPATIENT)
Dept: CARDIAC REHAB | Facility: HOSPITAL | Age: 69
End: 2024-07-09
Attending: INTERNAL MEDICINE
Payer: MEDICARE

## 2024-07-09 PROCEDURE — 93798 PHYS/QHP OP CAR RHAB W/ECG: CPT

## 2024-07-10 ENCOUNTER — APPOINTMENT (OUTPATIENT)
Dept: CARDIAC REHAB | Facility: HOSPITAL | Age: 69
End: 2024-07-10
Attending: INTERNAL MEDICINE
Payer: MEDICARE

## 2024-07-11 ENCOUNTER — CARDPULM VISIT (OUTPATIENT)
Dept: CARDIAC REHAB | Facility: HOSPITAL | Age: 69
End: 2024-07-11
Attending: INTERNAL MEDICINE
Payer: MEDICARE

## 2024-07-11 PROCEDURE — 93798 PHYS/QHP OP CAR RHAB W/ECG: CPT

## 2024-07-12 ENCOUNTER — APPOINTMENT (OUTPATIENT)
Dept: CARDIAC REHAB | Facility: HOSPITAL | Age: 69
End: 2024-07-12
Attending: INTERNAL MEDICINE
Payer: MEDICARE

## 2024-07-15 ENCOUNTER — APPOINTMENT (OUTPATIENT)
Dept: CARDIAC REHAB | Facility: HOSPITAL | Age: 69
End: 2024-07-15
Attending: INTERNAL MEDICINE
Payer: MEDICARE

## 2024-07-16 ENCOUNTER — APPOINTMENT (OUTPATIENT)
Dept: CARDIAC REHAB | Facility: HOSPITAL | Age: 69
End: 2024-07-16
Attending: INTERNAL MEDICINE
Payer: MEDICARE

## 2024-07-16 PROCEDURE — 93798 PHYS/QHP OP CAR RHAB W/ECG: CPT

## 2024-07-17 ENCOUNTER — APPOINTMENT (OUTPATIENT)
Dept: CARDIAC REHAB | Facility: HOSPITAL | Age: 69
End: 2024-07-17
Attending: INTERNAL MEDICINE
Payer: MEDICARE

## 2024-07-18 ENCOUNTER — APPOINTMENT (OUTPATIENT)
Dept: CARDIAC REHAB | Facility: HOSPITAL | Age: 69
End: 2024-07-18
Attending: INTERNAL MEDICINE
Payer: MEDICARE

## 2024-07-19 ENCOUNTER — APPOINTMENT (OUTPATIENT)
Dept: CARDIAC REHAB | Facility: HOSPITAL | Age: 69
End: 2024-07-19
Attending: INTERNAL MEDICINE
Payer: MEDICARE

## 2024-07-22 ENCOUNTER — APPOINTMENT (OUTPATIENT)
Dept: CARDIAC REHAB | Facility: HOSPITAL | Age: 69
End: 2024-07-22
Attending: INTERNAL MEDICINE
Payer: MEDICARE

## 2024-07-23 ENCOUNTER — APPOINTMENT (OUTPATIENT)
Dept: CARDIAC REHAB | Facility: HOSPITAL | Age: 69
End: 2024-07-23
Attending: INTERNAL MEDICINE
Payer: MEDICARE

## 2024-07-24 ENCOUNTER — APPOINTMENT (OUTPATIENT)
Dept: CARDIAC REHAB | Facility: HOSPITAL | Age: 69
End: 2024-07-24
Attending: INTERNAL MEDICINE
Payer: MEDICARE

## 2024-07-25 ENCOUNTER — CARDPULM VISIT (OUTPATIENT)
Dept: CARDIAC REHAB | Facility: HOSPITAL | Age: 69
End: 2024-07-25
Attending: INTERNAL MEDICINE
Payer: MEDICARE

## 2024-07-25 PROCEDURE — 93798 PHYS/QHP OP CAR RHAB W/ECG: CPT

## 2024-07-26 ENCOUNTER — APPOINTMENT (OUTPATIENT)
Dept: CARDIAC REHAB | Facility: HOSPITAL | Age: 69
End: 2024-07-26
Attending: INTERNAL MEDICINE
Payer: MEDICARE

## 2024-07-27 LAB — AMB EXT COVID-19 RESULT: DETECTED

## 2024-07-28 ENCOUNTER — PATIENT MESSAGE (OUTPATIENT)
Dept: FAMILY MEDICINE CLINIC | Facility: CLINIC | Age: 69
End: 2024-07-28

## 2024-07-29 ENCOUNTER — NURSE TRIAGE (OUTPATIENT)
Dept: FAMILY MEDICINE CLINIC | Facility: CLINIC | Age: 69
End: 2024-07-29

## 2024-07-29 ENCOUNTER — APPOINTMENT (OUTPATIENT)
Dept: CARDIAC REHAB | Facility: HOSPITAL | Age: 69
End: 2024-07-29
Attending: INTERNAL MEDICINE
Payer: MEDICARE

## 2024-07-30 ENCOUNTER — APPOINTMENT (OUTPATIENT)
Dept: CARDIAC REHAB | Facility: HOSPITAL | Age: 69
End: 2024-07-30
Attending: INTERNAL MEDICINE
Payer: MEDICARE

## 2024-07-31 ENCOUNTER — APPOINTMENT (OUTPATIENT)
Dept: CARDIAC REHAB | Facility: HOSPITAL | Age: 69
End: 2024-07-31
Attending: INTERNAL MEDICINE
Payer: MEDICARE

## 2024-08-01 ENCOUNTER — APPOINTMENT (OUTPATIENT)
Dept: CARDIAC REHAB | Facility: HOSPITAL | Age: 69
End: 2024-08-01
Attending: INTERNAL MEDICINE
Payer: MEDICARE

## 2024-08-02 ENCOUNTER — APPOINTMENT (OUTPATIENT)
Dept: CARDIAC REHAB | Facility: HOSPITAL | Age: 69
End: 2024-08-02
Attending: INTERNAL MEDICINE
Payer: MEDICARE

## 2024-08-05 ENCOUNTER — APPOINTMENT (OUTPATIENT)
Dept: CARDIAC REHAB | Facility: HOSPITAL | Age: 69
End: 2024-08-05
Attending: INTERNAL MEDICINE
Payer: MEDICARE

## 2024-08-06 ENCOUNTER — APPOINTMENT (OUTPATIENT)
Dept: CARDIAC REHAB | Facility: HOSPITAL | Age: 69
End: 2024-08-06
Attending: INTERNAL MEDICINE
Payer: MEDICARE

## 2024-08-06 PROCEDURE — 93798 PHYS/QHP OP CAR RHAB W/ECG: CPT

## 2024-08-07 ENCOUNTER — APPOINTMENT (OUTPATIENT)
Dept: CARDIAC REHAB | Facility: HOSPITAL | Age: 69
End: 2024-08-07
Attending: INTERNAL MEDICINE
Payer: MEDICARE

## 2024-08-08 ENCOUNTER — APPOINTMENT (OUTPATIENT)
Dept: CARDIAC REHAB | Facility: HOSPITAL | Age: 69
End: 2024-08-08
Attending: INTERNAL MEDICINE
Payer: MEDICARE

## 2024-08-08 PROCEDURE — 93798 PHYS/QHP OP CAR RHAB W/ECG: CPT

## 2024-08-09 ENCOUNTER — APPOINTMENT (OUTPATIENT)
Dept: CARDIAC REHAB | Facility: HOSPITAL | Age: 69
End: 2024-08-09
Attending: INTERNAL MEDICINE
Payer: MEDICARE

## 2024-08-12 ENCOUNTER — APPOINTMENT (OUTPATIENT)
Dept: CARDIAC REHAB | Facility: HOSPITAL | Age: 69
End: 2024-08-12
Attending: INTERNAL MEDICINE
Payer: MEDICARE

## 2024-08-13 ENCOUNTER — APPOINTMENT (OUTPATIENT)
Dept: CARDIAC REHAB | Facility: HOSPITAL | Age: 69
End: 2024-08-13
Attending: INTERNAL MEDICINE
Payer: MEDICARE

## 2024-08-13 PROCEDURE — 93798 PHYS/QHP OP CAR RHAB W/ECG: CPT

## 2024-08-14 ENCOUNTER — LAB ENCOUNTER (OUTPATIENT)
Dept: LAB | Facility: HOSPITAL | Age: 69
End: 2024-08-14
Attending: INTERNAL MEDICINE
Payer: MEDICARE

## 2024-08-14 ENCOUNTER — APPOINTMENT (OUTPATIENT)
Dept: CARDIAC REHAB | Facility: HOSPITAL | Age: 69
End: 2024-08-14
Attending: INTERNAL MEDICINE
Payer: MEDICARE

## 2024-08-14 DIAGNOSIS — E78.5 HYPERLIPIDEMIA: Primary | ICD-10-CM

## 2024-08-14 LAB
CHOLEST SERPL-MCNC: 109 MG/DL (ref ?–200)
FASTING PATIENT LIPID ANSWER: YES
HDLC SERPL-MCNC: 54 MG/DL (ref 40–59)
LDLC SERPL CALC-MCNC: 45 MG/DL (ref ?–100)
NONHDLC SERPL-MCNC: 55 MG/DL (ref ?–130)
TRIGL SERPL-MCNC: 38 MG/DL (ref 30–149)
VLDLC SERPL CALC-MCNC: 5 MG/DL (ref 0–30)

## 2024-08-14 PROCEDURE — 80061 LIPID PANEL: CPT

## 2024-08-14 PROCEDURE — 36415 COLL VENOUS BLD VENIPUNCTURE: CPT

## 2024-08-15 ENCOUNTER — APPOINTMENT (OUTPATIENT)
Dept: CARDIAC REHAB | Facility: HOSPITAL | Age: 69
End: 2024-08-15
Attending: INTERNAL MEDICINE
Payer: MEDICARE

## 2024-08-15 PROCEDURE — 93798 PHYS/QHP OP CAR RHAB W/ECG: CPT

## 2024-08-16 ENCOUNTER — APPOINTMENT (OUTPATIENT)
Dept: CARDIAC REHAB | Facility: HOSPITAL | Age: 69
End: 2024-08-16
Attending: INTERNAL MEDICINE
Payer: MEDICARE

## 2024-08-20 ENCOUNTER — TELEPHONE (OUTPATIENT)
Dept: FAMILY MEDICINE CLINIC | Facility: CLINIC | Age: 69
End: 2024-08-20

## 2024-08-20 ENCOUNTER — CARDPULM VISIT (OUTPATIENT)
Dept: CARDIAC REHAB | Facility: HOSPITAL | Age: 69
End: 2024-08-20
Attending: INTERNAL MEDICINE
Payer: MEDICARE

## 2024-08-20 ENCOUNTER — OFFICE VISIT (OUTPATIENT)
Dept: FAMILY MEDICINE CLINIC | Facility: CLINIC | Age: 69
End: 2024-08-20
Payer: MEDICARE

## 2024-08-20 ENCOUNTER — TELEPHONE (OUTPATIENT)
Dept: ORTHOPEDICS CLINIC | Facility: CLINIC | Age: 69
End: 2024-08-20

## 2024-08-20 VITALS
BODY MASS INDEX: 26.84 KG/M2 | WEIGHT: 167 LBS | DIASTOLIC BLOOD PRESSURE: 50 MMHG | HEIGHT: 66 IN | HEART RATE: 54 BPM | SYSTOLIC BLOOD PRESSURE: 82 MMHG

## 2024-08-20 DIAGNOSIS — R22.31 MASS OF RIGHT FOREARM: ICD-10-CM

## 2024-08-20 DIAGNOSIS — M25.521 RIGHT ELBOW PAIN: Primary | ICD-10-CM

## 2024-08-20 DIAGNOSIS — I95.9 HYPOTENSION, UNSPECIFIED HYPOTENSION TYPE: Primary | ICD-10-CM

## 2024-08-20 PROCEDURE — 93798 PHYS/QHP OP CAR RHAB W/ECG: CPT

## 2024-08-20 PROCEDURE — 99213 OFFICE O/P EST LOW 20 MIN: CPT | Performed by: FAMILY MEDICINE

## 2024-08-20 NOTE — TELEPHONE ENCOUNTER
Contacted 's office to relay message per .  Patient was hypotensive in office visit and stopping metoprolol.  Nurse chiqui verbalized understanding will relay msg.

## 2024-08-20 NOTE — TELEPHONE ENCOUNTER
Patient is scheduled for bump on right elbow. Please advise if imaging is needed.  Future Appointments   Date Time Provider Department Center   8/21/2024  2:00 PM Chana Cannon PA [facility] [place]   8/22/2024  9:45 AM [facility] CARD PHASE 2 [facility] CP REHAB EM [facility]   8/23/2024  9:00 AM [facility] US RM1 L[facility]  EM [place]   8/27/2024  9:45 AM [facility] CARD PHASE 2 [facility] CP REHAB EM [facility]

## 2024-08-20 NOTE — PROGRESS NOTES
Blood pressure (!) 82/50, pulse 54, height 5' 6\" (1.676 m), weight 167 lb (75.8 kg).      Complaining of mass on the right forearm for the past week.  Went to cardiac rehab today.  He has just eaten.  He feels fine otherwise.    Objective large mass noted ulnar aspect right forearm    Patient comfortable no apparent distress    Assessment #1 hypotension #2 forearm mass    Plan #1 hold metoprolol contacted cardiologist office Dr. Estrella patient to contact Dr. Estrella's office also #2 referral to Dr. Chad Cee fluids

## 2024-08-21 ENCOUNTER — OFFICE VISIT (OUTPATIENT)
Dept: ORTHOPEDICS CLINIC | Facility: CLINIC | Age: 69
End: 2024-08-21
Payer: MEDICARE

## 2024-08-21 ENCOUNTER — HOSPITAL ENCOUNTER (OUTPATIENT)
Dept: GENERAL RADIOLOGY | Age: 69
Discharge: HOME | End: 2024-08-21
Attending: PHYSICIAN ASSISTANT
Payer: MEDICARE

## 2024-08-21 VITALS — WEIGHT: 167 LBS | BODY MASS INDEX: 26.84 KG/M2 | HEIGHT: 66 IN

## 2024-08-21 DIAGNOSIS — M25.521 RIGHT ELBOW PAIN: Primary | ICD-10-CM

## 2024-08-21 DIAGNOSIS — M25.521 RIGHT ELBOW PAIN: ICD-10-CM

## 2024-08-21 PROCEDURE — 73080 X-RAY EXAM OF ELBOW: CPT | Performed by: PHYSICIAN ASSISTANT

## 2024-08-21 NOTE — H&P
Clinic Note [facility]     Assessment/Plan:  69 year old male    Right elbow posterior mass just distal to the tip of the olecranon-this could be a presentation of bursitis or cyst.  Will try compression dressing for the next 3 to 4 weeks if symptoms persist we will order an MRI.    No diagnosis found.     Follow Up: 3 to 4 weeks via MyChart or phone call    Diagnostic Studies:  X-rays are negative for acute fracture-dislocation or deformity    Physical Exam:    Ht 5' 6\" (1.676 m)   Wt 167 lb (75.8 kg)   BMI 26.95 kg/m²     Constitutional: NAD. AOx3. Well-developed and Well-nourished.   Psychiatric: Normal mood/ affect/ behavior. Judgment and thought content normal.     Right upper Extremity:   Inspection: Skin Intact. No skin lesions. No gross deformity.  Mobile and fluctuant soft mass distal to the olecranon tip but on the medial/posterior aspect.  About 2 cm in diameter   Palpation: Nontender over the mass   Motion: Elbow: normal bilateral symmetric ext/flex  Wrist: normal bilateral symmetric ext/flex/sup/pro  Finger: full composite fist       CC: Other (RT ELBOW; ONSET: 3 WEEKS AGO /ALSO WANTS TO TALK ABOUT RT THUMB )        HPI: This 69 year old male presents with complaints of a mass to his right elbow.  It has been there for 3 weeks.  He has minimal pain from it.  He has not tried any treatment thus far.      Past Medical History:    Back pain    Hyperlipidemia       Past Surgical History:   Procedure Laterality Date    Arthroscopy, shoulder, surgi Right 04/02/2008 03/23/2009, 11/25/2009,     Colonoscopy  09/2020    Colonoscopy N/A 9/22/2020    Procedure: COLONOSCOPY, POSSIBLE BIOPSY, POSSIBLE POLYPECTOMY 76353;  Surgeon: Rivas Ledesma MD;  [place] [facility] [place]       Current Outpatient Medications   Medication Sig Dispense Refill    rosuvastatin (CRESTOR) 20 MG Oral Tab Take 1 tablet (20 mg total) by mouth nightly. For cholesterol. 90 tablet 1    aspirin 81 MG Oral Tab EC Take 1  tablet (81 mg total) by mouth daily. 30 tablet 0    ticagrelor 90 MG Oral Tab Take 1 tablet (90 mg total) by mouth every 12 (twelve) hours. 60 tablet 0    B Complex Oral Tab 1 tablet daily      Garlic-DHA-EPA (GARLIC/EPA OR) garlic         No Known Allergies    Family History   Problem Relation Age of Onset    Heart Disorder Mother 83        Heart Attack    Other (Tumor) Father 75        Brain Tumor    Heart Disorder Sister 65         actual Dx name    Heart Disorder Brother 65        Quartupal Bypas    Stroke Brother 62    Diabetes Neg     Glaucoma Neg     Macular degeneration Neg        Social History     Occupational History    Not on file   Tobacco Use    Smoking status: Never    Smokeless tobacco: Never   Vaping Use    Vaping status: Never Used   Substance and Sexual Activity    Alcohol use: Yes     Alcohol/week: 1.0 standard drink of alcohol     Types: 1 Cans of beer per week     Comment: social    Drug use: Never    Sexual activity: Not on file        Review of Systems (negative unless bolded):  General: fevers, chills, fatigue  CV:  chest pain, palpitations, leg swelling  Msk: bodyaches, neck pain, neck stiffness  Skin: rashes, open wounds, nonhealing ulcers  Hem: bleeds easily, bruise easily, immunocompromised  Neuro: dizziness, light headedness, headaches  Psych: anxious, depressed, anger issues  Chana Cannon PA-C  Hand, Wrist, & Elbow Surgery  Physician Assistant to Dr. [email or web address] [email or web address]  t: [phone]  f: [phone]

## 2024-08-22 ENCOUNTER — APPOINTMENT (OUTPATIENT)
Dept: CARDIAC REHAB | Facility: HOSPITAL | Age: 69
End: 2024-08-22
Attending: INTERNAL MEDICINE
Payer: MEDICARE

## 2024-08-23 ENCOUNTER — HOSPITAL ENCOUNTER (OUTPATIENT)
Dept: ULTRASOUND IMAGING | Age: 69
Discharge: HOME | End: 2024-08-23
Attending: FAMILY MEDICINE
Payer: MEDICARE

## 2024-08-23 ENCOUNTER — APPOINTMENT (OUTPATIENT)
Dept: CARDIAC REHAB | Facility: HOSPITAL | Age: 69
End: 2024-08-23
Attending: INTERNAL MEDICINE
Payer: MEDICARE

## 2024-08-23 DIAGNOSIS — R74.01 ELEVATED TRANSAMINASE LEVEL: ICD-10-CM

## 2024-08-23 DIAGNOSIS — E78.2 MIXED HYPERLIPIDEMIA: ICD-10-CM

## 2024-08-23 PROCEDURE — 76705 ECHO EXAM OF ABDOMEN: CPT | Performed by: FAMILY MEDICINE

## 2024-08-27 ENCOUNTER — APPOINTMENT (OUTPATIENT)
Dept: CARDIAC REHAB | Facility: HOSPITAL | Age: 69
End: 2024-08-27
Attending: INTERNAL MEDICINE
Payer: MEDICARE

## 2024-08-29 ENCOUNTER — HOSPITAL ENCOUNTER (OUTPATIENT)
Dept: CV DIAGNOSTICS | Facility: HOSPITAL | Age: 69
Discharge: HOME | End: 2024-08-29
Attending: INTERNAL MEDICINE
Payer: MEDICARE

## 2024-08-29 DIAGNOSIS — I21.3 ACUTE ST ELEVATION MYOCARDIAL INFARCTION (STEMI), UNSPECIFIED ARTERY (HCC): ICD-10-CM

## 2024-08-29 DIAGNOSIS — I25.10 ATHEROSCLEROTIC HEART DISEASE OF NATIVE CORONARY ARTERY WITHOUT ANGINA PECTORIS: ICD-10-CM

## 2024-08-29 PROCEDURE — 93306 TTE W/DOPPLER COMPLETE: CPT | Performed by: INTERNAL MEDICINE

## 2024-08-30 ENCOUNTER — APPOINTMENT (OUTPATIENT)
Dept: CARDIAC REHAB | Facility: HOSPITAL | Age: 69
End: 2024-08-30
Attending: INTERNAL MEDICINE
Payer: MEDICARE

## 2024-09-03 ENCOUNTER — APPOINTMENT (OUTPATIENT)
Dept: CARDIAC REHAB | Facility: HOSPITAL | Age: 69
End: 2024-09-03
Attending: INTERNAL MEDICINE
Payer: MEDICARE

## 2024-09-06 ENCOUNTER — APPOINTMENT (OUTPATIENT)
Dept: CARDIAC REHAB | Facility: HOSPITAL | Age: 69
End: 2024-09-06
Attending: INTERNAL MEDICINE
Payer: MEDICARE

## 2024-09-10 ENCOUNTER — APPOINTMENT (OUTPATIENT)
Dept: CARDIAC REHAB | Facility: HOSPITAL | Age: 69
End: 2024-09-10
Attending: INTERNAL MEDICINE
Payer: MEDICARE

## 2024-09-13 ENCOUNTER — APPOINTMENT (OUTPATIENT)
Dept: CARDIAC REHAB | Facility: HOSPITAL | Age: 69
End: 2024-09-13
Attending: INTERNAL MEDICINE
Payer: MEDICARE

## 2024-09-17 ENCOUNTER — CARDPULM VISIT (OUTPATIENT)
Dept: CARDIAC REHAB | Facility: HOSPITAL | Age: 69
End: 2024-09-17
Attending: INTERNAL MEDICINE
Payer: MEDICARE

## 2024-09-17 ENCOUNTER — PATIENT MESSAGE (OUTPATIENT)
Dept: FAMILY MEDICINE CLINIC | Facility: CLINIC | Age: 69
End: 2024-09-17

## 2024-09-17 PROCEDURE — 93798 PHYS/QHP OP CAR RHAB W/ECG: CPT

## 2024-09-19 ENCOUNTER — CARDPULM VISIT (OUTPATIENT)
Dept: CARDIAC REHAB | Facility: HOSPITAL | Age: 69
End: 2024-09-19
Attending: INTERNAL MEDICINE
Payer: MEDICARE

## 2024-09-19 PROCEDURE — 93798 PHYS/QHP OP CAR RHAB W/ECG: CPT

## 2024-09-20 ENCOUNTER — APPOINTMENT (OUTPATIENT)
Dept: CARDIAC REHAB | Facility: HOSPITAL | Age: 69
End: 2024-09-20
Attending: INTERNAL MEDICINE
Payer: MEDICARE

## 2024-09-24 ENCOUNTER — APPOINTMENT (OUTPATIENT)
Dept: CARDIAC REHAB | Facility: HOSPITAL | Age: 69
End: 2024-09-24
Attending: INTERNAL MEDICINE
Payer: MEDICARE

## 2024-09-24 PROCEDURE — 93798 PHYS/QHP OP CAR RHAB W/ECG: CPT

## 2024-09-26 ENCOUNTER — CARDPULM VISIT (OUTPATIENT)
Dept: CARDIAC REHAB | Facility: HOSPITAL | Age: 69
End: 2024-09-26
Attending: INTERNAL MEDICINE
Payer: MEDICARE

## 2024-09-26 PROCEDURE — 93798 PHYS/QHP OP CAR RHAB W/ECG: CPT

## 2024-09-27 ENCOUNTER — APPOINTMENT (OUTPATIENT)
Dept: CARDIAC REHAB | Facility: HOSPITAL | Age: 69
End: 2024-09-27
Attending: INTERNAL MEDICINE
Payer: MEDICARE

## 2024-10-01 ENCOUNTER — APPOINTMENT (OUTPATIENT)
Dept: CARDIAC REHAB | Facility: HOSPITAL | Age: 69
End: 2024-10-01
Attending: INTERNAL MEDICINE
Payer: MEDICARE

## 2024-10-01 PROCEDURE — 93798 PHYS/QHP OP CAR RHAB W/ECG: CPT

## 2024-10-03 ENCOUNTER — CARDPULM VISIT (OUTPATIENT)
Dept: CARDIAC REHAB | Facility: HOSPITAL | Age: 69
End: 2024-10-03
Attending: INTERNAL MEDICINE
Payer: MEDICARE

## 2024-10-03 PROCEDURE — 93798 PHYS/QHP OP CAR RHAB W/ECG: CPT

## 2024-10-04 ENCOUNTER — APPOINTMENT (OUTPATIENT)
Dept: CARDIAC REHAB | Facility: HOSPITAL | Age: 69
End: 2024-10-04
Attending: INTERNAL MEDICINE
Payer: MEDICARE

## 2024-10-08 ENCOUNTER — APPOINTMENT (OUTPATIENT)
Dept: CARDIAC REHAB | Facility: HOSPITAL | Age: 69
End: 2024-10-08
Attending: INTERNAL MEDICINE
Payer: MEDICARE

## 2024-10-08 PROCEDURE — 93798 PHYS/QHP OP CAR RHAB W/ECG: CPT

## 2024-10-10 ENCOUNTER — CARDPULM VISIT (OUTPATIENT)
Dept: CARDIAC REHAB | Facility: HOSPITAL | Age: 69
End: 2024-10-10
Attending: INTERNAL MEDICINE
Payer: MEDICARE

## 2024-10-10 PROCEDURE — 93798 PHYS/QHP OP CAR RHAB W/ECG: CPT

## 2024-10-11 ENCOUNTER — APPOINTMENT (OUTPATIENT)
Dept: CARDIAC REHAB | Facility: HOSPITAL | Age: 69
End: 2024-10-11
Attending: INTERNAL MEDICINE
Payer: MEDICARE

## 2024-10-15 ENCOUNTER — CARDPULM VISIT (OUTPATIENT)
Dept: CARDIAC REHAB | Facility: HOSPITAL | Age: 69
End: 2024-10-15
Attending: INTERNAL MEDICINE
Payer: MEDICARE

## 2024-10-15 PROCEDURE — 93798 PHYS/QHP OP CAR RHAB W/ECG: CPT

## 2024-10-17 ENCOUNTER — CARDPULM VISIT (OUTPATIENT)
Dept: CARDIAC REHAB | Facility: HOSPITAL | Age: 69
End: 2024-10-17
Attending: INTERNAL MEDICINE
Payer: MEDICARE

## 2024-10-17 PROCEDURE — 93798 PHYS/QHP OP CAR RHAB W/ECG: CPT

## 2024-10-22 ENCOUNTER — CARDPULM VISIT (OUTPATIENT)
Dept: CARDIAC REHAB | Facility: HOSPITAL | Age: 69
End: 2024-10-22
Attending: INTERNAL MEDICINE
Payer: MEDICARE

## 2024-10-22 PROCEDURE — 93798 PHYS/QHP OP CAR RHAB W/ECG: CPT

## 2024-10-29 ENCOUNTER — CARDPULM VISIT (OUTPATIENT)
Dept: CARDIAC REHAB | Facility: HOSPITAL | Age: 69
End: 2024-10-29
Attending: INTERNAL MEDICINE
Payer: MEDICARE

## 2024-10-29 PROCEDURE — 93798 PHYS/QHP OP CAR RHAB W/ECG: CPT

## 2024-10-31 ENCOUNTER — CARDPULM VISIT (OUTPATIENT)
Dept: CARDIAC REHAB | Facility: HOSPITAL | Age: 69
End: 2024-10-31
Attending: INTERNAL MEDICINE
Payer: MEDICARE

## 2024-10-31 PROCEDURE — 93798 PHYS/QHP OP CAR RHAB W/ECG: CPT

## 2024-11-04 ENCOUNTER — PATIENT MESSAGE (OUTPATIENT)
Dept: ORTHOPEDICS CLINIC | Facility: CLINIC | Age: 69
End: 2024-11-04

## 2024-11-04 DIAGNOSIS — R22.31 ELBOW MASS, RIGHT: Primary | ICD-10-CM

## 2024-11-05 ENCOUNTER — APPOINTMENT (OUTPATIENT)
Dept: CARDIAC REHAB | Facility: HOSPITAL | Age: 69
End: 2024-11-05
Attending: INTERNAL MEDICINE
Payer: MEDICARE

## 2024-11-07 ENCOUNTER — CARDPULM VISIT (OUTPATIENT)
Dept: CARDIAC REHAB | Facility: HOSPITAL | Age: 69
End: 2024-11-07
Attending: INTERNAL MEDICINE
Payer: MEDICARE

## 2024-11-12 ENCOUNTER — APPOINTMENT (OUTPATIENT)
Dept: CARDIAC REHAB | Facility: HOSPITAL | Age: 69
End: 2024-11-12
Attending: INTERNAL MEDICINE
Payer: MEDICARE

## 2024-11-14 ENCOUNTER — APPOINTMENT (OUTPATIENT)
Dept: CARDIAC REHAB | Facility: HOSPITAL | Age: 69
End: 2024-11-14
Attending: INTERNAL MEDICINE
Payer: MEDICARE

## 2024-11-19 ENCOUNTER — APPOINTMENT (OUTPATIENT)
Dept: CARDIAC REHAB | Facility: HOSPITAL | Age: 69
End: 2024-11-19
Attending: INTERNAL MEDICINE
Payer: MEDICARE

## 2024-11-21 ENCOUNTER — APPOINTMENT (OUTPATIENT)
Dept: CARDIAC REHAB | Facility: HOSPITAL | Age: 69
End: 2024-11-21
Attending: INTERNAL MEDICINE
Payer: MEDICARE

## 2024-11-26 ENCOUNTER — APPOINTMENT (OUTPATIENT)
Dept: CARDIAC REHAB | Facility: HOSPITAL | Age: 69
End: 2024-11-26
Attending: INTERNAL MEDICINE
Payer: MEDICARE

## 2024-12-03 ENCOUNTER — APPOINTMENT (OUTPATIENT)
Dept: CARDIAC REHAB | Facility: HOSPITAL | Age: 69
End: 2024-12-03
Attending: INTERNAL MEDICINE
Payer: MEDICARE

## 2024-12-05 ENCOUNTER — APPOINTMENT (OUTPATIENT)
Dept: CARDIAC REHAB | Facility: HOSPITAL | Age: 69
End: 2024-12-05
Attending: INTERNAL MEDICINE
Payer: MEDICARE

## 2024-12-25 ENCOUNTER — PATIENT MESSAGE (OUTPATIENT)
Dept: FAMILY MEDICINE CLINIC | Facility: CLINIC | Age: 69
End: 2024-12-25

## 2024-12-25 DIAGNOSIS — E78.2 MIXED HYPERLIPIDEMIA: ICD-10-CM

## 2024-12-27 RX ORDER — ROSUVASTATIN CALCIUM 20 MG/1
20 TABLET, COATED ORAL NIGHTLY
Qty: 90 TABLET | Refills: 1 | Status: SHIPPED | OUTPATIENT
Start: 2024-12-27

## 2024-12-27 NOTE — TELEPHONE ENCOUNTER
Please review; protocol failed. Or has no protocol    Requested Prescriptions   Pending Prescriptions Disp Refills    ROSUVASTATIN 20 MG Oral Tab [Pharmacy Med Name: ROSUVASTATIN 20MG TABLETS] 90 tablet 1     Sig: TAKE 1 TABLET(20 MG) BY MOUTH EVERY NIGHT FOR CHOLESTEROL       Cholesterol Medication Protocol Failed - 12/27/2024  7:35 AM        Failed - ALT < 80     Lab Results   Component Value Date    ALT 93 (H) 06/21/2024             Passed - ALT resulted within past year        Passed - Lipid panel within past 12 months     Lab Results   Component Value Date    CHOLEST 109 08/14/2024    TRIG 38 08/14/2024    HDL 54 08/14/2024    LDL 45 08/14/2024    VLDL 5 08/14/2024    NONHDLC 55 08/14/2024             Passed - In person appointment or virtual visit in the past 12 mos or appointment in next 3 mos     Recent Outpatient Visits              4 months ago Right elbow pain    [facility], [place] Kohlmann, Ellen, PA    Office Visit    4 months ago Hypotension, unspecified hypotension type    [facility], [place] Cespedes, David B,     Office Visit    5 months ago Hemorrhoids, unspecified hemorrhoid type    [facility], [place] Cespedes, David B,     Telemedicine    6 months ago Medicare annual wellness visit, subsequent    [facility], [place] Cespedes, David B,     Office Visit    8 months ago ST elevation myocardial infarction (STEMI), unspecified artery (HCC)    [facility], [place] Cespedes, David B,     Office Visit                            Recent Outpatient Visits              4 months ago Right elbow pain    [facility], [place] Kohlmann, Ellen, PA    Office Visit    4 months ago Hypotension, unspecified hypotension type    [facility], [place] Cespedes, David B,     Office Visit    5  months ago Hemorrhoids, unspecified hemorrhoid type    [facility], [place] Cespedes, David B, DO    Telemedicine    6 months ago Medicare annual wellness visit, subsequent    [facility], [place] Cespedes, David B,     Office Visit    8 months ago ST elevation myocardial infarction (STEMI), unspecified artery (HCC)    [facility], [place] Cespedes, David B, DO    Office Visit

## 2025-01-04 ENCOUNTER — APPOINTMENT (OUTPATIENT)
Dept: CT IMAGING | Facility: HOSPITAL | Age: 70
End: 2025-01-04
Attending: EMERGENCY MEDICINE
Payer: MEDICARE

## 2025-01-04 ENCOUNTER — APPOINTMENT (OUTPATIENT)
Dept: MRI IMAGING | Facility: HOSPITAL | Age: 70
End: 2025-01-04
Attending: EMERGENCY MEDICINE
Payer: MEDICARE

## 2025-01-04 ENCOUNTER — HOSPITAL ENCOUNTER (EMERGENCY)
Facility: HOSPITAL | Age: 70
Discharge: HOME | End: 2025-01-05
Attending: EMERGENCY MEDICINE
Payer: MEDICARE

## 2025-01-04 DIAGNOSIS — I74.9 TIA DUE TO EMBOLISM (HCC): ICD-10-CM

## 2025-01-04 DIAGNOSIS — G45.9 TIA (TRANSIENT ISCHEMIC ATTACK): Primary | ICD-10-CM

## 2025-01-04 DIAGNOSIS — G45.9 TIA DUE TO EMBOLISM (HCC): ICD-10-CM

## 2025-01-04 LAB
ALBUMIN SERPL-MCNC: 4.7 G/DL (ref 3.2–4.8)
ALBUMIN/GLOB SERPL: 1.6 {RATIO} (ref 1–2)
ALP LIVER SERPL-CCNC: 67 U/L
ALT SERPL-CCNC: 23 U/L
ANION GAP SERPL CALC-SCNC: 6 MMOL/L (ref 0–18)
APTT PPP: 27.4 SECONDS (ref 23–36)
AST SERPL-CCNC: 29 U/L (ref ?–34)
BASOPHILS # BLD AUTO: 0.03 X10(3) UL (ref 0–0.2)
BASOPHILS NFR BLD AUTO: 0.5 %
BILIRUB SERPL-MCNC: 0.5 MG/DL (ref 0.2–1.1)
BUN BLD-MCNC: 17 MG/DL (ref 9–23)
BUN/CREAT SERPL: 14.9 (ref 10–20)
CALCIUM BLD-MCNC: 10.2 MG/DL (ref 8.7–10.4)
CHLORIDE SERPL-SCNC: 107 MMOL/L (ref 98–112)
CO2 SERPL-SCNC: 30 MMOL/L (ref 21–32)
CREAT BLD-MCNC: 1.14 MG/DL
DEPRECATED RDW RBC AUTO: 48.2 FL (ref 35.1–46.3)
EGFRCR SERPLBLD CKD-EPI 2021: 70 ML/MIN/1.73M2 (ref 60–?)
EOSINOPHIL # BLD AUTO: 0.15 X10(3) UL (ref 0–0.7)
EOSINOPHIL NFR BLD AUTO: 2.5 %
ERYTHROCYTE [DISTWIDTH] IN BLOOD BY AUTOMATED COUNT: 13.5 % (ref 11–15)
GLOBULIN PLAS-MCNC: 2.9 G/DL (ref 2–3.5)
GLUCOSE BLD-MCNC: 117 MG/DL (ref 70–99)
GLUCOSE BLDC GLUCOMTR-MCNC: 113 MG/DL (ref 70–99)
HCT VFR BLD AUTO: 42.4 %
HGB BLD-MCNC: 14 G/DL
IMM GRANULOCYTES # BLD AUTO: 0 X10(3) UL (ref 0–1)
IMM GRANULOCYTES NFR BLD: 0 %
INR BLD: 0.98 (ref 0.8–1.2)
LYMPHOCYTES # BLD AUTO: 2.31 X10(3) UL (ref 1–4)
LYMPHOCYTES NFR BLD AUTO: 38.2 %
MCH RBC QN AUTO: 32 PG (ref 26–34)
MCHC RBC AUTO-ENTMCNC: 33 G/DL (ref 31–37)
MCV RBC AUTO: 97 FL
MONOCYTES # BLD AUTO: 0.59 X10(3) UL (ref 0.1–1)
MONOCYTES NFR BLD AUTO: 9.8 %
NEUTROPHILS # BLD AUTO: 2.96 X10 (3) UL (ref 1.5–7.7)
NEUTROPHILS # BLD AUTO: 2.96 X10(3) UL (ref 1.5–7.7)
NEUTROPHILS NFR BLD AUTO: 49 %
OSMOLALITY SERPL CALC.SUM OF ELEC: 299 MOSM/KG (ref 275–295)
PLATELET # BLD AUTO: 177 10(3)UL (ref 150–450)
POTASSIUM SERPL-SCNC: 4.2 MMOL/L (ref 3.5–5.1)
PROT SERPL-MCNC: 7.6 G/DL (ref 5.7–8.2)
PROTHROMBIN TIME: 13.6 SECONDS (ref 11.6–14.8)
RBC # BLD AUTO: 4.37 X10(6)UL
SODIUM SERPL-SCNC: 143 MMOL/L (ref 136–145)
WBC # BLD AUTO: 6 X10(3) UL (ref 4–11)

## 2025-01-04 PROCEDURE — 82962 GLUCOSE BLOOD TEST: CPT

## 2025-01-04 PROCEDURE — 85610 PROTHROMBIN TIME: CPT | Performed by: EMERGENCY MEDICINE

## 2025-01-04 PROCEDURE — 70498 CT ANGIOGRAPHY NECK: CPT | Performed by: EMERGENCY MEDICINE

## 2025-01-04 PROCEDURE — 80053 COMPREHEN METABOLIC PANEL: CPT | Performed by: EMERGENCY MEDICINE

## 2025-01-04 PROCEDURE — 99285 EMERGENCY DEPT VISIT HI MDM: CPT

## 2025-01-04 PROCEDURE — 70450 CT HEAD/BRAIN W/O DYE: CPT | Performed by: EMERGENCY MEDICINE

## 2025-01-04 PROCEDURE — 85730 THROMBOPLASTIN TIME PARTIAL: CPT | Performed by: EMERGENCY MEDICINE

## 2025-01-04 PROCEDURE — 93010 ELECTROCARDIOGRAM REPORT: CPT

## 2025-01-04 PROCEDURE — 70551 MRI BRAIN STEM W/O DYE: CPT | Performed by: EMERGENCY MEDICINE

## 2025-01-04 PROCEDURE — 70496 CT ANGIOGRAPHY HEAD: CPT | Performed by: EMERGENCY MEDICINE

## 2025-01-04 PROCEDURE — 85025 COMPLETE CBC W/AUTO DIFF WBC: CPT | Performed by: EMERGENCY MEDICINE

## 2025-01-04 PROCEDURE — 93005 ELECTROCARDIOGRAM TRACING: CPT

## 2025-01-04 PROCEDURE — 36415 COLL VENOUS BLD VENIPUNCTURE: CPT

## 2025-01-04 PROCEDURE — 99284 EMERGENCY DEPT VISIT MOD MDM: CPT

## 2025-01-05 ENCOUNTER — PATIENT MESSAGE (OUTPATIENT)
Dept: FAMILY MEDICINE CLINIC | Facility: CLINIC | Age: 70
End: 2025-01-05

## 2025-01-05 VITALS
HEIGHT: 66 IN | WEIGHT: 163 LBS | HEART RATE: 55 BPM | DIASTOLIC BLOOD PRESSURE: 78 MMHG | TEMPERATURE: 98 F | OXYGEN SATURATION: 98 % | SYSTOLIC BLOOD PRESSURE: 116 MMHG | RESPIRATION RATE: 17 BRPM | BODY MASS INDEX: 26.2 KG/M2

## 2025-01-05 NOTE — ED INITIAL ASSESSMENT (HPI)
Patient arrives to the ED stating his left arm \"fell dead\" was unable to feel anything on the left arm for about 5 mins, happened 10 mins prior to coming in. Sensation to the arm has now returned. Denies any trauma. Hx MI in April.

## 2025-01-05 NOTE — ED QUICK NOTES
Patient stated his symptoms started at 2130 today. Patient is able to move left arm without difficulty. Patient does state has intermittent left shoulder pain. Patient feels he's back to his baseline. Patient does have occasional floaters in both eyes but does not have them at the moment.

## 2025-01-05 NOTE — ED PROVIDER NOTES
Patient Seen in: [facility] Emergency Department      History     Chief Complaint   Patient presents with    Numbness Weakness     Stated Complaint: Arm pain    Subjective:   HPI          Objective:     Past Medical History:    Back pain    Heart attack (HCC)    Hyperlipidemia              Past Surgical History:   Procedure Laterality Date    Arthroscopy, shoulder, surgi Right 04/02/2008 03/23/2009, 11/25/2009,     Colonoscopy  09/2020    Colonoscopy N/A 9/22/2020    Procedure: COLONOSCOPY, POSSIBLE BIOPSY, POSSIBLE POLYPECTOMY 36035;  Surgeon: Rivas Ledesma MD;  [facility]                Social History     Socioeconomic History    Marital status:    Tobacco Use    Smoking status: Never    Smokeless tobacco: Never   Vaping Use    Vaping status: Never Used   Substance and Sexual Activity    Alcohol use: Yes     Alcohol/week: 1.0 standard drink of alcohol     Types: 1 Cans of beer per week     Comment: social    Drug use: Yes     Types: Cannabis     Social Drivers of Health     Financial Resource Strain: Low Risk  (4/9/2024)    Financial Resource Strain     Difficulty of Paying Living Expenses: Not hard at all     Med Affordability: No   Food Insecurity: No Food Insecurity (4/3/2024)    Food Insecurity     Food Insecurity: Never true   Transportation Needs: No Transportation Needs (4/9/2024)    Transportation Needs     Lack of Transportation: No   Housing Stability: Low Risk  (4/3/2024)    Housing Stability     Housing Instability: No                  Physical Exam     ED Triage Vitals [01/04/25 2151]   /83   Pulse 70   Resp 18   Temp 97.8 °F (36.6 °C)   Temp src Oral   SpO2 99 %   O2 Device None (Room air)       Current Vitals:   Vital Signs  BP: 116/78  Pulse: 55  Resp: 17  Temp: 97.8 °F (36.6 °C)  Temp src: Oral  MAP (mmHg): 74    Oxygen Therapy  SpO2: 98 %  O2 Device: None (Room air)        Physical Exam        ED Course     Labs Reviewed   COMP METABOLIC PANEL  (14) - Abnormal; Notable for the following components:       Result Value    Glucose 117 (*)     Calculated Osmolality 299 (*)     All other components within normal limits   CBC WITH DIFFERENTIAL WITH PLATELET - Abnormal; Notable for the following components:    RDW-SD 48.2 (*)     All other components within normal limits   POCT GLUCOSE - Abnormal; Notable for the following components:    POC Glucose  113 (*)     All other components within normal limits   PROTHROMBIN TIME (PT) - Normal   PTT, ACTIVATED - Normal     EKG    Rate, intervals and axes as noted on EKG Report.  Rate: 71  Rhythm: Sinus Rhythm  Reading: Normal sinus rhythm without signs of acute ischemia or abnormal intervals.             ED Course as of 01/05/25 0040  ------------------------------------------------------------  Time: 01/05 0030  Value: MRI BRAIN WO ACUTE (3) SEQUENCE (CPT=70551)  Comment: IMPRESSION:    Diffusion images reveal no evidence of recent ischemic infarction.    No evidence of acute intracranial abnormality.    No evidence of intracranial mass or hemorrhage or mass effect.    Involution and chronic small vessel ischemic disease.    Linear area of increased FLAIR signal in the posterior right frontal lobe superiorly, without correlate on diffusion images.  This is suggestive of old infarct.    Absent flow void within the right internal carotid artery, corresponding to CTA findings.    Minimal ethmoid sinus mucosal thickening.                MDM      69-year-old male with a history of CAD/STEMI status post stenting presents today after an episode of left upper extremity weakness.  Patient states about 10 minutes prior to arrival he had sudden complete weakness of his left upper extremity.  Denies numbness, headache, blurry vision, facial droop, speech difficulty, or other symptoms.  Symptoms lasted for about 5 minutes and then resolved.  No history of similar episodes or stroke.  No seizure-like activity.    On exam, vitals  normal, well-appearing, no facial droop, no gaze deviation, PERRLA, EOMI, no visual field deficit, no pronator drift, strength and sensation intact in the upper and lower extremities.    Differential: TIA, seizure, transient left upper extremity weakness    Patient evaluated as a stroke code and received CT imaging as well as three-phase MRI which showed no acute stroke.    Patient observed in the emergency department and remained asymptomatic.    Neurology was consulted.  We will plan to discharge the patient with instructions on follow-up in the TIA clinic and with careful return precautions.        MDM    Disposition and Plan     Clinical Impression:  1. TIA (transient ischemic attack)    2. TIA due to embolism (HCC)         Disposition:  Discharge  1/5/2025 12:32 am    [facility]  [address] [facility] [address]  [facility] [address]  Call  [phone] choose option 1 for general neurology and state that you are following up for TIA    We recommend that you schedule follow up care with a primary care provider within the next three months to obtain basic health screening including reassessment of your blood pressure.      Medications Prescribed:  Current Discharge Medication List              Supplementary Documentation:            TNK/ NI Documentation:    Date/Time last known well:   N/A    NIHSS on presentation: 0     Chief Complaint   Patient presents with    Numbness Weakness     IV Tenecteplase (TNK) administered: No; Patient is not a Candidate for IV TNK due to: Mild nondisabling symptoms or rapidly improving symptoms    Candidate for Endovascular thrombectomy (EVT): No; Patient is not a candidate for Endovascular Thrombectomy due to: No large vessel occlusion ( LVO)  on CTA/MRA imaging      Disposition: Discharge

## 2025-01-06 LAB
ATRIAL RATE: 71 BPM
P AXIS: 63 DEGREES
P-R INTERVAL: 140 MS
Q-T INTERVAL: 370 MS
QRS DURATION: 62 MS
QTC CALCULATION (BEZET): 402 MS
R AXIS: 6 DEGREES
T AXIS: -1 DEGREES
VENTRICULAR RATE: 71 BPM

## 2025-01-07 NOTE — TELEPHONE ENCOUNTER
History of TIA CTA of neck showed complete occlusion right internal carotid artery.  Appropriate to refer to you?

## 2025-01-07 NOTE — TELEPHONE ENCOUNTER
Please see [employer] message. Per chart review, patient has appointment tomorrow with neuro.    Future Appointments   Date Time Provider Department Center   1/8/2025 11:30 AM Miky Alejo MD [facility] [place] [facility]

## 2025-01-08 ENCOUNTER — OFFICE VISIT (OUTPATIENT)
Dept: NEUROLOGY | Facility: CLINIC | Age: 70
End: 2025-01-08
Payer: MEDICARE

## 2025-01-08 VITALS — HEART RATE: 66 BPM | SYSTOLIC BLOOD PRESSURE: 123 MMHG | DIASTOLIC BLOOD PRESSURE: 64 MMHG

## 2025-01-08 DIAGNOSIS — R29.898 LEFT ARM WEAKNESS: ICD-10-CM

## 2025-01-08 DIAGNOSIS — G45.9 TIA (TRANSIENT ISCHEMIC ATTACK): Primary | ICD-10-CM

## 2025-01-08 DIAGNOSIS — I65.21 STENOSIS OF RIGHT CAROTID ARTERY: ICD-10-CM

## 2025-01-08 PROBLEM — G47.33 OBSTRUCTIVE SLEEP APNEA (ADULT) (PEDIATRIC): Status: ACTIVE | Noted: 2017-09-14

## 2025-01-08 PROBLEM — I25.10 ATHEROSCLEROTIC HEART DISEASE OF NATIVE CORONARY ARTERY WITHOUT ANGINA PECTORIS: Status: ACTIVE | Noted: 2024-04-30

## 2025-01-08 PROBLEM — M54.50 LOW BACK PAIN AT MULTIPLE SITES: Status: ACTIVE | Noted: 2021-05-28

## 2025-01-08 NOTE — PROGRESS NOTES
[facility] [address], [facility] [address]  [facility] [address]  [phone]            Neurology Initial Visit     Referred By: Dr. Rader ref. provider found    Chief Complaint:   Chief Complaint   Patient presents with    Neurologic Problem     NPT Patient presents here today to establish care, patient was seen and referred by [facility] ER to follow up. Medical staff reported 69-year-old male with a history of CAD/STEMI status post stenting presents today after an episode of left upper extremity weakness.  Patient states about 10 minutes prior to arrival he had sudden complete weakness of his left upper extremity.  Denies numbness, headache, blurry vision, facial droop, speech difficulty, or other symptoms.  Symptoms lasted for about 5 minutes and t       HPI:     Glenn Torres is a 69 year old male, who presents for incidental left arm weakness.  The patient presents with a transient ischemic attack (TIA) characterized by sudden onset of left arm weakness, numbness, and loss of motor function. The episode lasted approximately 3-5 minutes and resolved spontaneously. The patient denies any facial involvement, speech difficulties, vision changes, or language problems. Symptoms had completely resolved by the time of hospital arrival. The patient reports no prior history of similar episodes.    Subsequent imaging revealed complete occlusion of the right carotid artery in the neck. The patient is currently on aspirin and Brilinta. The patient denies any history of hypertension or diabetes. Cholesterol levels were reported as \"quite good\" on last year's check-up. The patient is taking an statin.    The patient reports improved exercise habits and is following a Mediterranean diet. They are also working on weight loss. The patient is taking Prolia, though the reason for this medication is not specified in the provided information.      Past Medical History:    Back pain    Heart attack (HCC)     Hyperlipidemia       Past Surgical History:   Procedure Laterality Date    Arthroscopy, shoulder, surgi Right 04/02/2008 03/23/2009, 11/25/2009,     Colonoscopy  09/2020    Colonoscopy N/A 9/22/2020    Procedure: COLONOSCOPY, POSSIBLE BIOPSY, POSSIBLE POLYPECTOMY 52262;  Surgeon: Rivas Ledesma MD;  [facility]: [facility]       Social history:  History   Smoking Status    Never   Smokeless Tobacco    Never     History   Alcohol Use    1.0 standard drink of alcohol/week    1 Cans of beer per week     Comment: social     History   Drug Use    Types: Cannabis       Family History   Problem Relation Age of Onset    Heart Disorder Mother 83        Heart Attack    Other (Tumor) Father 75        Brain Tumor    Heart Disorder Sister 65         actual Dx name    Heart Disorder Brother 65        Quartupal Bypas    Stroke Brother 62    Diabetes Neg     Glaucoma Neg     Macular degeneration Neg          Current Outpatient Medications:     rosuvastatin 20 MG Oral Tab, Take 1 tablet (20 mg total) by mouth nightly., Disp: 90 tablet, Rfl: 1    aspirin 81 MG Oral Tab EC, Take 1 tablet (81 mg total) by mouth daily., Disp: 30 tablet, Rfl: 0    ticagrelor 90 MG Oral Tab, Take 1 tablet (90 mg total) by mouth every 12 (twelve) hours., Disp: 60 tablet, Rfl: 0    B Complex Oral Tab, 1 tablet daily, Disp: , Rfl:     Garlic-DHA-EPA (GARLIC/EPA OR), garlic, Disp: , Rfl:     Allergies[1]    ROS:   As in HPI, the rest of the 14 system review was done and was negative      Physical Exam:  Vitals:    01/08/25 1157   BP: 123/64   Pulse: 66       General: No apparent distress, well nourished, well groomed.  Head- Normocephalic, atraumatic  Eyes- No redness or swelling  ENT- Hearing intake, normal glutition  Neck- No masses or adenopathy  Cv: pulses were palpable and normal, no cyanosis or edema     Neurological:     Mental Status- Alert and oriented x3.  Normal attention span and concentration  Thought process intact  Memory  intact- recent and remote  Mood intact  Fund of knowledge appropriate for education and age    Language intact including: comprehension, naming, repetition, vocabulary    Cranial Nerves:  VII. Face symmetric, no facial weakness  VIII. Hearing intact to whisper.  IX. Pallet elevates symmetrically.  XI. Shoulder shrug is intact  XII. Tongue is midline    Motor Exam:  Muscle tone normal  No atrophy or fasciculations  Rapid alternating movements intact    Gait:  Normal posture  Normal physiologic      Labs:    Lab Results   Component Value Date    TSH 1.386 06/21/2024     Lab Results   Component Value Date    HDL 54 08/14/2024    LDL 45 08/14/2024    TRIG 38 08/14/2024     Lab Results   Component Value Date    HGB 14.0 01/04/2025    HCT 42.4 01/04/2025    MCV 97.0 01/04/2025    WBC 6.0 01/04/2025    .0 01/04/2025      Lab Results   Component Value Date    BUN 17 01/04/2025    CA 10.2 01/04/2025    ALT 23 01/04/2025    AST 29 01/04/2025    ALB 4.7 01/04/2025     01/04/2025    K 4.2 01/04/2025     01/04/2025    CO2 30.0 01/04/2025      I have reviewed labs.    Imaging Studies:  I have independently reviewed imaging.  MRI of the brain as well as CT angiogram was independently reviewed as above.        Assessment   1. TIA (transient ischemic attack)    Assessment and Plan:    Transient Ischemic Attack (TIA):  Continue with aspirin and Brilinta for blood thinning.  Schedule an ultrasound and heart monitor to check for heart arrhythmias and clots.    Carotid Artery Occlusion:  Refer to a vascular surgeon for further evaluation and discussion of possible interventions.    Cholesterol Management:  Continue cholesterol medication.  Monitor cholesterol levels regularly.  Encourage weight loss, increased exercise, and adherence to a Mediterranean diet.    Cervical Spine Evaluation:  Schedule a cervical spine MRI to rule out spinal cord impairment.  Avoid chiropractic neck adjustments due to potential risk of  dissection.    - Vascular Surgery - In Network    2. Stenosis of right carotid artery    - Vascular Surgery - In Network    3. Left arm weakness    - MRI SPINE CERVICAL (CPT=72141); Future           Education and counseling provided to patient. Instructed patient to call my office or seek medical attention immediately if symptoms worsen.  Patient verbalized understanding of information given. All questions were answered. All side effects of drugs were discussed.     Return to clinic in: Return in about 3 months (around 4/8/2025).    Miky Alejo MD           [1] No Known Allergies

## 2025-01-09 NOTE — TELEPHONE ENCOUNTER
Neurology appt is schedule for April  Patient seeing vascular 1/13/25    Follow up post vascular visit or wait until April visit?

## 2025-01-13 ENCOUNTER — OFFICE VISIT (OUTPATIENT)
Facility: CLINIC | Age: 70
End: 2025-01-13
Payer: MEDICARE

## 2025-01-13 VITALS — HEIGHT: 64.17 IN | BODY MASS INDEX: 27.83 KG/M2 | WEIGHT: 163 LBS

## 2025-01-13 DIAGNOSIS — I65.21 OCCLUSION OF RIGHT CAROTID ARTERY: ICD-10-CM

## 2025-01-13 DIAGNOSIS — G45.9 TRANSIENT ISCHEMIC ATTACK (TIA): Primary | ICD-10-CM

## 2025-01-14 NOTE — PROGRESS NOTES
Rosa Fritz MD  Vascular Surgery  [facility]      VASCULAR SURGERY   CLINIC CONSULT NOTE      Name: Glenn Torres   :   1955  [id number]     REFERRING PHYSICIAN:  Miky Alejo  PRIMARY CARE PHYSICIAN:  Jose Croft, DO    I was requested by Dr. Alejo to visit Glenn Torres for right carotid artery occlusion    HISTORY OF PRESENT ILLNESS:   Patient is a 69 year old male with past medical history of coronary artery disease and HLP who has been referred regarding management of his carotid artery disease seen on a recent CT angio of the head and neck.  Patient presented with transient left upper extremity numbness and weakness early in January.  Workup with CT angio of head and neck was negative for stroke, however he was found to have a right carotid  occlusion while the left has mild stenosis.    Of note he used to have symptoms compatible with right amaurosis fugax which has repeated approximately for 10 times, with last time being over a year ago.   He smokes cigar recreationally.  He has been on aspirin and Brilinta due to history of coronary artery disease managed by cardiology.    PAST MEDICAL HISTORY:    Past Medical History:    Back pain    Heart attack (HCC)    Hyperlipidemia       PAST SURGICAL HISTORY:   Past Surgical History:   Procedure Laterality Date    Arthroscopy, shoulder, surgi Right 2008, 2009,     Colonoscopy  2020    Colonoscopy N/A 2020    Procedure: COLONOSCOPY, POSSIBLE BIOPSY, POSSIBLE POLYPECTOMY 56777;  Surgeon: Rivas Ledesma MD;  [place] [facility] [place] [facility]        MEDICATIONS:     Current Outpatient Medications:     rosuvastatin 20 MG Oral Tab, Take 1 tablet (20 mg total) by mouth nightly., Disp: 90 tablet, Rfl: 1    aspirin 81 MG Oral Tab EC, Take 1 tablet (81 mg total) by mouth daily., Disp: 30 tablet, Rfl: 0    ticagrelor 90 MG Oral Tab, Take 1 tablet (90 mg total) by mouth every 12 (twelve)  hours., Disp: 60 tablet, Rfl: 0    B Complex Oral Tab, 1 tablet daily, Disp: , Rfl:     Garlic-DHA-EPA (GARLIC/EPA OR), garlic, Disp: , Rfl:     ALLERGIES:    He has No Known Allergies.    SOCIAL HISTORY:    Patient  reports that he has never smoked. He has never used smokeless tobacco. He reports current alcohol use of about 1.0 standard drink of alcohol per week. He reports current drug use. Drug: Cannabis.    FAMILY HISTORY:    Patient's family history includes Heart Disorder (age of onset: 65) in his brother and sister; Heart Disorder (age of onset: 83) in his mother; Stroke (age of onset: 62) in his brother; Tumor (age of onset: 75) in his father.    ROS:     A 12 point review of systems with pertinent positives and negatives listed in the HPI.    EXAM:    Ht 5' 4.17\" (1.63 m)   Wt 163 lb (73.9 kg)   BMI 27.83 kg/m²   GENERAL: alert and orientated X 3, well developed, well nourished, in no apparent distress  PSYCH: normal mood and affect  HEENT: ears and throat are clear  NECK: supple, no lymphadenopathy, thyroid wnl  CAROTID: no bruits bilaterally  RESPIRATORY: no rales, rhonchi, or wheezes B  CARDIO: RRR without murmur, no murmur, no gallop   ABDOMEN: soft, non-tender with no palpable aneurysm or masses  BACK: normal, no tenderness  SKIN: no rashes, warm and dry  EXTREMITIES: no tenderness  NEURO: no sensory or motor deficits  VASCULAR:      Femoral Popliteal DP PT Peroneal   Right 1+ 1+ palpable, strong palpable, strong    Left 1+ 1+ palpable, strong palpable, strong          IMAGING:       ASSESSMENT  Diagnoses and all orders for this visit:    Transient ischemic attack (TIA)  -     CARD ECHO 2D DOPPLER (CPT=93306); Future  -     US CAROTID DUPLEX (CPT=93880); Future    Occlusion of right carotid artery         The patient has  right carotid artery occlusion.  I reassured the patient that at this point the chance of a stroke related to the right carotid artery disease is actually zero.  I also explained  that his prior amaurosis fugax symptoms were most likely related to his 99% stenosis of the right carotid artery which is now completely occluded.    Considering patient's symptoms I believe that they still have to rule out other embolic source of his symptoms with cardiac emboli being the most common.  I have recommended obtaining an echocardiogram to decide whether the patient has a cardiac source for his symptoms early January.    I will also order bilateral duplex ultrasound of carotid arteries to double check there is no flow on the right side, although the chance of that is extremely low.  I we will consider that duplex ultrasound as a baseline study for future follow-ups to survey the left carotid artery disease.  I counseled the patient on general cerebrovascular risk factor control for stroke and MI prevention including  hypertension control, antiplatelet and statin therapy, diabetes monitoring as well as lifestyle modifications including a healthy low fat/low calorie diet, regular moderate exercise, and continuing to abstain from smoking and decrease alcohol use.   The patient understood and all questions were answered.    PLAN:  - Echocardiogram with bubble studies to evaluate right-to-left shunt, wall motion abnormalities or chamber thrombus  -Bilateral duplex ultrasound of the carotid arteries as a baseline study for to survey left carotid artery disease  - Follow-up in 6 months.      The patient indicated an understanding of these issues and agreed to the plan and all questions were answered during the clinic visit.      Thank you for allowing me to participate in your patient's care.   Please do not hesitate to contact me with any questions.    Rosa Fritz MD  [facility], Division of Vascular Surgery    Please note: Dragon speech recognition software was used to prepare this note. If a word or phrase is confusing, it is likely do to a failure of recognition.   Please contact me with any  questions or clarifications.

## 2025-01-16 ENCOUNTER — OFFICE VISIT (OUTPATIENT)
Dept: FAMILY MEDICINE CLINIC | Facility: CLINIC | Age: 70
End: 2025-01-16
Payer: MEDICARE

## 2025-01-16 VITALS
WEIGHT: 169 LBS | DIASTOLIC BLOOD PRESSURE: 78 MMHG | HEART RATE: 58 BPM | HEIGHT: 66 IN | BODY MASS INDEX: 27.16 KG/M2 | SYSTOLIC BLOOD PRESSURE: 131 MMHG

## 2025-01-16 DIAGNOSIS — G45.9 TIA (TRANSIENT ISCHEMIC ATTACK): Primary | ICD-10-CM

## 2025-01-16 PROCEDURE — 99212 OFFICE O/P EST SF 10 MIN: CPT | Performed by: FAMILY MEDICINE

## 2025-01-16 NOTE — PROGRESS NOTES
Blood pressure 131/78, pulse 58, height 5' 6\" (1.676 m), weight 169 lb (76.7 kg).      Following up for TIA.  Currently feels well.  No pain no weakness.  Was advised to schedule echocardiogram and carotid artery duplex.  Also MRI of the neck.    Objective comfortable no apparent distress    Assessment status post TIA    Plan encouraged compliance with ordered exams by specialty    Follow-up for Medicare annual physical in June

## 2025-01-17 DIAGNOSIS — I65.21 OCCLUSION OF RIGHT CAROTID ARTERY: Primary | ICD-10-CM

## 2025-02-03 ENCOUNTER — HOSPITAL ENCOUNTER (OUTPATIENT)
Dept: MRI IMAGING | Facility: HOSPITAL | Age: 70
Discharge: HOME | End: 2025-02-03
Attending: Other
Payer: MEDICARE

## 2025-02-03 DIAGNOSIS — R29.898 LEFT ARM WEAKNESS: ICD-10-CM

## 2025-02-03 PROCEDURE — 72141 MRI NECK SPINE W/O DYE: CPT | Performed by: OTHER

## 2025-02-07 ENCOUNTER — HOSPITAL ENCOUNTER (OUTPATIENT)
Dept: CV DIAGNOSTICS | Facility: HOSPITAL | Age: 70
Discharge: HOME | End: 2025-02-07
Attending: STUDENT IN AN ORGANIZED HEALTH CARE EDUCATION/TRAINING PROGRAM
Payer: MEDICARE

## 2025-02-07 ENCOUNTER — HOSPITAL ENCOUNTER (OUTPATIENT)
Dept: ULTRASOUND IMAGING | Facility: HOSPITAL | Age: 70
Discharge: HOME | End: 2025-02-07
Attending: STUDENT IN AN ORGANIZED HEALTH CARE EDUCATION/TRAINING PROGRAM
Payer: MEDICARE

## 2025-02-07 ENCOUNTER — TELEPHONE (OUTPATIENT)
Dept: NEUROLOGY | Facility: CLINIC | Age: 70
End: 2025-02-07

## 2025-02-07 DIAGNOSIS — M54.12 CERVICAL RADICULOPATHY: Primary | ICD-10-CM

## 2025-02-07 DIAGNOSIS — G45.9 TRANSIENT ISCHEMIC ATTACK (TIA): ICD-10-CM

## 2025-02-07 PROCEDURE — 93306 TTE W/DOPPLER COMPLETE: CPT | Performed by: STUDENT IN AN ORGANIZED HEALTH CARE EDUCATION/TRAINING PROGRAM

## 2025-02-07 PROCEDURE — 93880 EXTRACRANIAL BILAT STUDY: CPT | Performed by: STUDENT IN AN ORGANIZED HEALTH CARE EDUCATION/TRAINING PROGRAM

## 2025-02-07 NOTE — TELEPHONE ENCOUNTER
Please let patient know that MRI of the cervical spine showed some degenerative changes and bulging disks, that could be contributing to some of the numbness and weakness in the left arm.  Will start with physical therapy.

## 2025-02-11 DIAGNOSIS — I65.21 CAROTID OCCLUSION, RIGHT: Primary | ICD-10-CM

## 2025-03-06 ENCOUNTER — PATIENT MESSAGE (OUTPATIENT)
Dept: NEUROLOGY | Facility: CLINIC | Age: 70
End: 2025-03-06

## 2025-04-08 ENCOUNTER — OFFICE VISIT (OUTPATIENT)
Dept: NEUROLOGY | Facility: CLINIC | Age: 70
End: 2025-04-08
Payer: MEDICARE

## 2025-04-08 VITALS — HEART RATE: 68 BPM | SYSTOLIC BLOOD PRESSURE: 125 MMHG | DIASTOLIC BLOOD PRESSURE: 68 MMHG

## 2025-04-08 DIAGNOSIS — G45.9 TIA (TRANSIENT ISCHEMIC ATTACK): Primary | ICD-10-CM

## 2025-04-08 DIAGNOSIS — M54.12 CERVICAL RADICULOPATHY: ICD-10-CM

## 2025-04-08 DIAGNOSIS — I65.21 STENOSIS OF RIGHT CAROTID ARTERY: ICD-10-CM

## 2025-04-08 PROBLEM — I63.239: Status: ACTIVE | Noted: 2025-03-11

## 2025-04-08 NOTE — PROGRESS NOTES
[facility] [address]  [facility] [address]  [phone]            Neurology Follow up Visit     Referred By: Dr. Rader ref. provider found    Chief Complaint:   Chief Complaint   Patient presents with    TIA     LOV 1/8/2025 Seen for TIA.    Patient here today 3 month follow up for test orders/results of    and medication management  .   Denies numbness, hot/cold sensation, tingling, dizziness or nausea.   Patient gives verbal consent to use [employer].        HPI:     Glenn Torres Sr. is a 69 year old male, who presents for incidental left arm weakness.  The patient presents with a transient ischemic attack (TIA) characterized by sudden onset of left arm weakness, numbness, and loss of motor function. The episode lasted approximately 3-5 minutes and resolved spontaneously. The patient denies any facial involvement, speech difficulties, vision changes, or language problems. Symptoms had completely resolved by the time of hospital arrival. The patient reports no prior history of similar episodes.    Subsequent imaging revealed complete occlusion of the right carotid artery in the neck. The patient is currently on aspirin and Brilinta. The patient denies any history of hypertension or diabetes. Cholesterol levels were reported as \"quite good\" on last year's check-up. The patient is taking an statin.    The patient reports improved exercise habits and is following a Mediterranean diet. They are also working on weight loss.     Patient was seen by vascular surgeon, no intervention was required.  Doppler of carotid arteries was repeated.  Chronic occlusion of the right ICA still noted.  Left carotid artery did not show any significant stenosis.  Echocardiogram was not revealing.  Cardiac monitor also was ordered but did not seem to have been done by the time patient came back for follow up in April 2025.    History of Present Illness    There is a concern for cervical  spine arthritis contributing to neck issues. However, the described left arm paralysis is unlikely due to cervical spine issues as it was complete paralysis, suggesting a central cause rather than peripheral nerve compression.    He has not been monitoring his blood pressure regularly. His last cholesterol check was in August of the previous year. He is actively working on lifestyle modifications, including exercise, diet, and weight management, to reduce his stroke risk.        Past Medical History:    Back pain    Heart attack (HCC)    Hyperlipidemia       Past Surgical History:   Procedure Laterality Date    Arthroscopy, shoulder, surgi Right 04/02/2008 03/23/2009, 11/25/2009,     Colonoscopy  09/2020    Colonoscopy N/A 09/22/2020    Procedure: COLONOSCOPY, POSSIBLE BIOPSY, POSSIBLE POLYPECTOMY 02393;  Surgeon: Rivas Ledesma MD;  [place] [facility] [place] [facility]    Coronary stent placement  04/03/2024       Social history:  History   Smoking Status    Never   Smokeless Tobacco    Never     History   Alcohol Use    1.0 standard drink of alcohol/week    1 Cans of beer per week     Comment: social     History   Drug Use    Types: Cannabis       Family History   Problem Relation Age of Onset    Heart Disorder Mother 83        Heart Attack    Other (Tumor) Father 75        Brain Tumor    Heart Disorder Sister 65         actual Dx name    Heart Disorder Brother 65        Quartupal Bypas    Stroke Brother 62    Diabetes Neg     Glaucoma Neg     Macular degeneration Neg          Current Outpatient Medications:     ticagrelor 60 MG Oral Tab, , Disp: , Rfl:     rosuvastatin 20 MG Oral Tab, Take 1 tablet (20 mg total) by mouth nightly., Disp: 90 tablet, Rfl: 1    aspirin 81 MG Oral Tab EC, Take 1 tablet (81 mg total) by mouth daily., Disp: 30 tablet, Rfl: 0    B Complex Oral Tab, 1 tablet daily, Disp: , Rfl:     Garlic-DHA-EPA (GARLIC/EPA OR), garlic, Disp: , Rfl:     Allergies[1]    ROS:   As in HPI, the rest of  the 14 system review was done and was negative      Physical Exam:  There were no vitals filed for this visit.      General: No apparent distress, well nourished, well groomed.  Head- Normocephalic, atraumatic  Eyes- No redness or swelling  ENT- Hearing intake, normal glutition  Neck- No masses or adenopathy  Cv: pulses were palpable and normal, no cyanosis or edema     Neurological:     Mental Status- Alert and oriented x3.  Normal attention span and concentration  Thought process intact  Memory intact- recent and remote  Mood intact  Fund of knowledge appropriate for education and age    Language intact including: comprehension, naming, repetition, vocabulary    Cranial Nerves:  VII. Face symmetric, no facial weakness  VIII. Hearing intact to whisper.  IX. Pallet elevates symmetrically.  XI. Shoulder shrug is intact  XII. Tongue is midline    Motor Exam:  Muscle tone normal  No atrophy or fasciculations  Rapid alternating movements intact    Gait:  Normal posture  Normal physiologic      Labs:    Lab Results   Component Value Date    TSH 1.386 06/21/2024     Lab Results   Component Value Date    HDL 54 08/14/2024    LDL 45 08/14/2024    TRIG 38 08/14/2024     Lab Results   Component Value Date    HGB 14.0 01/04/2025    HCT 42.4 01/04/2025    MCV 97.0 01/04/2025    WBC 6.0 01/04/2025    .0 01/04/2025      Lab Results   Component Value Date    BUN 17 01/04/2025    CA 10.2 01/04/2025    ALT 23 01/04/2025    AST 29 01/04/2025    ALB 4.7 01/04/2025     01/04/2025    K 4.2 01/04/2025     01/04/2025    CO2 30.0 01/04/2025      I have reviewed labs.    Imaging Studies:  I have independently reviewed imaging.  MRI of the brain as well as CT angiogram was independently reviewed as above.        Assessment   1. TIA (transient ischemic attack)    Assessment and Plan:    Transient Ischemic Attack (TIA):  Continue with aspirin and Brilinta for blood thinning.  Echocardiogram was not revealing, with heart  monitor is still pending to be done    Cholesterol Management:  Continue cholesterol medication.  Monitor cholesterol levels regularly.  Encourage weight loss, increased exercise, and adherence to a Mediterranean diet.    Cervical Spine Evaluation:  Might consider physical therapy.    2. Stenosis of right carotid artery         Education and counseling provided to patient. Instructed patient to call my office or seek medical attention immediately if symptoms worsen.  Patient verbalized understanding of information given. All questions were answered. All side effects of drugs were discussed.     Return to clinic in: No follow-ups on file.    Miky Alejo MD           [1] No Known Allergies

## 2025-04-25 ENCOUNTER — PATIENT MESSAGE (OUTPATIENT)
Dept: FAMILY MEDICINE CLINIC | Facility: CLINIC | Age: 70
End: 2025-04-25

## 2025-04-28 ENCOUNTER — HOSPITAL ENCOUNTER (OUTPATIENT)
Dept: CV DIAGNOSTICS | Facility: HOSPITAL | Age: 70
Discharge: HOME | End: 2025-04-28
Attending: Other
Payer: MEDICARE

## 2025-04-28 ENCOUNTER — LAB ENCOUNTER (OUTPATIENT)
Dept: LAB | Age: 70
End: 2025-04-28
Attending: Other
Payer: MEDICARE

## 2025-04-28 DIAGNOSIS — E78.2 MIXED HYPERLIPIDEMIA: ICD-10-CM

## 2025-04-28 DIAGNOSIS — G45.9 TIA (TRANSIENT ISCHEMIC ATTACK): ICD-10-CM

## 2025-04-28 LAB
ALT SERPL-CCNC: 24 U/L (ref 10–49)
AST SERPL-CCNC: 29 U/L (ref ?–34)
CHOLEST SERPL-MCNC: 108 MG/DL (ref ?–200)
FASTING PATIENT LIPID ANSWER: YES
HDLC SERPL-MCNC: 54 MG/DL (ref 40–59)
LDLC SERPL CALC-MCNC: 42 MG/DL (ref ?–100)
NONHDLC SERPL-MCNC: 54 MG/DL (ref ?–130)
TRIGL SERPL-MCNC: 46 MG/DL (ref 30–149)
VLDLC SERPL CALC-MCNC: 6 MG/DL (ref 0–30)

## 2025-04-28 PROCEDURE — 84450 TRANSFERASE (AST) (SGOT): CPT

## 2025-04-28 PROCEDURE — 80061 LIPID PANEL: CPT

## 2025-04-28 PROCEDURE — 84460 ALANINE AMINO (ALT) (SGPT): CPT

## 2025-04-28 PROCEDURE — 93246 EXT ECG>7D<15D RECORDING: CPT | Performed by: OTHER

## 2025-04-28 PROCEDURE — 93247 EXT ECG>7D<15D SCAN A/R: CPT | Performed by: OTHER

## 2025-04-28 PROCEDURE — 36415 COLL VENOUS BLD VENIPUNCTURE: CPT

## 2025-05-21 ENCOUNTER — TELEPHONE (OUTPATIENT)
Dept: NEUROLOGY | Facility: CLINIC | Age: 70
End: 2025-05-21

## 2025-05-21 NOTE — TELEPHONE ENCOUNTER
Toya message sent with provider comments--    ----- Message from Miky Alejo sent at 5/19/2025  2:12 PM CDT -----  Please let the patient know that cardiac monitor didn't show signs of the a. Fib that could have contributed to the TIA/stroke symptoms.  ----- Message -----  From: Interface, [facility]  Sent: 5/19/2025   1:24 PM CDT  To: Miky Alejo MD    
Adult

## 2025-06-03 ENCOUNTER — HOSPITAL ENCOUNTER (OUTPATIENT)
Dept: MRI IMAGING | Facility: HOSPITAL | Age: 70
Discharge: HOME | End: 2025-06-03
Attending: PHYSICIAN ASSISTANT
Payer: MEDICARE

## 2025-06-03 DIAGNOSIS — R22.31 ELBOW MASS, RIGHT: ICD-10-CM

## 2025-06-03 PROCEDURE — 73221 MRI JOINT UPR EXTREM W/O DYE: CPT | Performed by: PHYSICIAN ASSISTANT

## 2025-06-10 ENCOUNTER — TELEPHONE (OUTPATIENT)
Facility: CLINIC | Age: 70
End: 2025-06-10

## 2025-06-11 NOTE — TELEPHONE ENCOUNTER
BRY LMOVM FOR PT TO RESCHEDULE US AND OV. Please transfer to [phone] if patient calls main line. Thanks!

## 2025-06-18 ENCOUNTER — OFFICE VISIT (OUTPATIENT)
Dept: ORTHOPEDICS CLINIC | Facility: CLINIC | Age: 70
End: 2025-06-18
Payer: MEDICARE

## 2025-06-18 VITALS — WEIGHT: 169 LBS | HEIGHT: 66 IN | BODY MASS INDEX: 27.16 KG/M2

## 2025-06-18 DIAGNOSIS — R22.31 ELBOW MASS, RIGHT: Primary | ICD-10-CM

## 2025-06-18 DIAGNOSIS — I74.9 TIA DUE TO EMBOLISM (HCC): ICD-10-CM

## 2025-06-18 DIAGNOSIS — G45.9 TIA DUE TO EMBOLISM (HCC): ICD-10-CM

## 2025-06-18 PROCEDURE — 99212 OFFICE O/P EST SF 10 MIN: CPT | Performed by: PHYSICIAN ASSISTANT

## 2025-06-18 NOTE — PROGRESS NOTES
Clinic Note [facility]     Assessment/Plan:  69 year old male    Right elbow posterior mass just distal to the tip of the olecranon-this could be a presentation of bursitis or cyst.  Patient had an MRI since his cyst was still there but it is since gone away and he no longer has any pain so would not recommend any treatment.  All of his questions were answered.    Follow Up: As needed    Diagnostic Studies:  X-rays are negative for acute fracture-dislocation or deformity    Physical Exam:    Ht 5' 6\" (1.676 m)   Wt 169 lb (76.7 kg)   BMI 27.28 kg/m²     Constitutional: NAD. AOx3. Well-developed and Well-nourished.   Psychiatric: Normal mood/ affect/ behavior. Judgment and thought content normal.     Right upper Extremity:   Inspection: Skin Intact. No skin lesions. No gross deformity.  No longer has any mass on the elbow.   Palpation: Nontender over the mass   Motion: Elbow: normal bilateral symmetric ext/flex  Wrist: normal bilateral symmetric ext/flex/sup/pro  Finger: full composite fist       CC: Follow - Up (RT ELBOW; MRI RESULTS )        HPI: This 69 year old male presents with complaints of a mass to his right elbow.  It has been there for 3 weeks.  He has minimal pain from it.  He has not tried any treatment thus far.      Interval history: Patient states he does not have any elbow pain and is here for MRI results    Past Medical History:    Back pain    Heart attack (HCC)    Hyperlipidemia       Past Surgical History:   Procedure Laterality Date    Arthroscopy, shoulder, surgi Right 04/02/2008 03/23/2009, 11/25/2009,     Colonoscopy  09/2020    Colonoscopy N/A 09/22/2020    Procedure: COLONOSCOPY, POSSIBLE BIOPSY, POSSIBLE POLYPECTOMY 10665;  Surgeon: Rivas Ledesma MD;  [place] [facility] [place] [facility] [place]    Coronary stent placement  04/03/2024       Current Outpatient Medications   Medication Sig Dispense Refill    ticagrelor 60 MG Oral Tab       rosuvastatin 20 MG Oral Tab Take 1 tablet (20  mg total) by mouth nightly. 90 tablet 1    aspirin 81 MG Oral Tab EC Take 1 tablet (81 mg total) by mouth daily. 30 tablet 0    B Complex Oral Tab 1 tablet daily      Garlic-DHA-EPA (GARLIC/EPA OR) garlic         No Known Allergies    Family History   Problem Relation Age of Onset    Heart Disorder Mother 83        Heart Attack    Other (Tumor) Father 75        Brain Tumor    Heart Disorder Sister 65         actual Dx name    Heart Disorder Brother 65        Quartupal Bypas    Stroke Brother 62    Diabetes Neg     Glaucoma Neg     Macular degeneration Neg        Social History     Occupational History    Not on file   Tobacco Use    Smoking status: Never    Smokeless tobacco: Never   Vaping Use    Vaping status: Never Used   Substance and Sexual Activity    Alcohol use: Yes     Alcohol/week: 1.0 standard drink of alcohol     Types: 1 Cans of beer per week     Comment: social    Drug use: Yes     Types: Cannabis    Sexual activity: Not on file        Review of Systems (negative unless bolded):  General: fevers, chills, fatigue  CV:  chest pain, palpitations, leg swelling  Msk: bodyaches, neck pain, neck stiffness  Skin: rashes, open wounds, nonhealing ulcers  Hem: bleeds easily, bruise easily, immunocompromised  Neuro: dizziness, light headedness, headaches  Psych: anxious, depressed, anger issues  Chana Cannon PA-C  Hand, Wrist, & Elbow Surgery  Physician Assistant to Dr. [email or web address] [email or web address]  t: [phone]  f: [phone]

## 2025-06-21 DIAGNOSIS — E78.2 MIXED HYPERLIPIDEMIA: ICD-10-CM

## 2025-06-24 RX ORDER — ROSUVASTATIN CALCIUM 20 MG/1
20 TABLET, COATED ORAL NIGHTLY
Qty: 90 TABLET | Refills: 3 | Status: SHIPPED | OUTPATIENT
Start: 2025-06-24

## 2025-06-27 ENCOUNTER — TELEPHONE (OUTPATIENT)
Dept: FAMILY MEDICINE CLINIC | Facility: CLINIC | Age: 70
End: 2025-06-27

## 2025-07-14 ENCOUNTER — NURSE ONLY (OUTPATIENT)
Facility: CLINIC | Age: 70
End: 2025-07-14
Payer: MEDICARE

## 2025-07-14 ENCOUNTER — OFFICE VISIT (OUTPATIENT)
Facility: CLINIC | Age: 70
End: 2025-07-14
Payer: MEDICARE

## 2025-07-14 DIAGNOSIS — I65.21 OCCLUSION OF RIGHT CAROTID ARTERY: Primary | ICD-10-CM

## 2025-07-14 DIAGNOSIS — I65.21 OCCLUSION OF RIGHT CAROTID ARTERY: ICD-10-CM

## 2025-07-14 PROCEDURE — 93880 EXTRACRANIAL BILAT STUDY: CPT | Performed by: STUDENT IN AN ORGANIZED HEALTH CARE EDUCATION/TRAINING PROGRAM

## 2025-07-14 PROCEDURE — 99214 OFFICE O/P EST MOD 30 MIN: CPT | Performed by: STUDENT IN AN ORGANIZED HEALTH CARE EDUCATION/TRAINING PROGRAM

## 2025-07-14 NOTE — PROGRESS NOTES
Rosa Fritz MD  Vascular Surgery  [facility]       VASCULAR SURGERY OFFICE NOTE        Name: Glenn Torres Sr.   : 1955  [id number]     REASON FOR VISIT:   Patient is a 69 year old male with past medical history of coronary artery disease and HLP who I saw first in January for his carotid artery disease seen on a recent CT angio of the head and neck.  Patient presented with transient left upper extremity numbness and weakness early in January.  Workup with CT angio of head and neck was negative for stroke, however he was found to have a right carotid  occlusion while the left has mild stenosis.    He used to have symptoms compatible with right amaurosis fugax, with last time of symptoms being 1.5 years ago.   He smokes cigar recreationally.  He has been on aspirin and Brilinta due to history of coronary artery disease managed by cardiology, but now currently on ASA 81mg and Rosuvastatin 20mg dily.  He denies having symptoms compatible with embolic TIA such as focal motor or sensory deficit, slurred speech, or amaurosis fugax.  today's duplex ultrasound of bilateral carotid artery is notable for 100% occlusion of the right internal carotid artery and less than 50% stenosis of the left internal and common carotid artery.    PAST MEDICAL HISTORY:   Past Medical History[1]    PAST SURGICAL HISTORY:   Past Surgical History[2]     MEDICATIONS:   Medications - Current[3]    LABSS:     Lab Results   Component Value Date     2024    A1C 5.7 (H) 2024      Lab Results   Component Value Date     (H) 2025    BUN 17 2025    CREATSERUM 1.14 2025    BUNCREA 14.9 2025    ANIONGAP 6 2025    GFRAA 90 2022    GFRNAA 78 2022    CA 10.2 2025     2025    K 4.2 2025     2025    CO2 30.0 2025    OSMOCALC 299 (H) 2025      Lab Results   Component Value Date    WBC 6.0 2025    RBC 4.37  01/04/2025    HGB 14.0 01/04/2025    HCT 42.4 01/04/2025    MCV 97.0 01/04/2025    MCH 32.0 01/04/2025    MCHC 33.0 01/04/2025    RDW 13.5 01/04/2025    .0 01/04/2025        Lab Results   Component Value Date    HGB 14.0 01/04/2025    HGB 13.5 04/10/2024    HGB 12.4 (L) 04/05/2024    HGB 12.3 (L) 04/04/2024    HGB 15.5 04/02/2024    CREATSERUM 1.14 01/04/2025    CREATSERUM 0.97 06/21/2024    CREATSERUM 0.87 04/05/2024    CREATSERUM 0.99 04/04/2024    CREATSERUM 1.13 04/02/2024    CREATSERUM 1.00 07/12/2022       EXAM:   There were no vitals taken for this visit.         ASSESSMENT    There are no diagnoses linked to this encounter.  69 year old male with past medical history of coronary artery disease and HLP who is here for a follow-up visit after being diagnosed with total occlusion of the right internal carotid artery and less than 50% stenosis of the left internal and common carotid artery.  He denies having symptoms compatible with embolic TIA such as focal motor or sensory deficit, slurred speech, or amaurosis fugax.   6-month arterial duplex ultrasound of the bilateral carotid artery is notable for total occlusion of the right internal carotid artery as well as less than 50% stenosis of the left common and internal carotid artery.    PLAN:     -I explained to the patient that since he has complete occlusion of the right carotid artery and less than 50% stenosis of the left common and internal carotid artery which is essentially age associated changes, I do not think we need to necessarily repeat these ultrasound of the carotid arteries and said unless patient has symptoms concerning for embolic TIA.  - I emphasized on the importance of taking aspirin and statin lifelong.  - I explained to the patient that I would not necessarily schedule him a follow-up appointment, however I will gladly see him in the clinic as needed.        Rosa Fritz MD  [facility], Division of the Vascular Surgery        [1]   Past Medical History:   Back pain    Heart attack (HCC)    Hyperlipidemia   [2]   Past Surgical History:  Procedure Laterality Date    Arthroscopy, shoulder, surgi Right 04/02/2008 03/23/2009, 11/25/2009,     Colonoscopy  09/2020    Colonoscopy N/A 09/22/2020    Procedure: COLONOSCOPY, POSSIBLE BIOPSY, POSSIBLE POLYPECTOMY 31768;  Surgeon: Rivas Ledesma MD;  Location: [facility]    Coronary stent placement  04/03/2024   [3]   Current Outpatient Medications:     rosuvastatin 20 MG Oral Tab, Take 1 tablet (20 mg total) by mouth nightly., Disp: 90 tablet, Rfl: 3    ticagrelor 60 MG Oral Tab, , Disp: , Rfl:     aspirin 81 MG Oral Tab EC, Take 1 tablet (81 mg total) by mouth daily., Disp: 30 tablet, Rfl: 0    B Complex Oral Tab, 1 tablet daily, Disp: , Rfl:     Garlic-DHA-EPA (GARLIC/EPA OR), garlic, Disp: , Rfl:

## 2025-07-17 DIAGNOSIS — I65.22 STENOSIS OF LEFT CAROTID ARTERY WITHOUT CEREBRAL INFARCTION: ICD-10-CM

## 2025-07-17 DIAGNOSIS — Q21.12 PATENT FORAMEN OVALE (HCC): Primary | ICD-10-CM

## (undated) NOTE — LETTER
July 20, 2023      No Recipients     Patient: Randy Martins   YOB: 1955   Date of Visit: 7/20/2023       Dear Dr. Janice Castillo Recipients: Thank you for referring Randy Martins to me for evaluation. Here is my assessment and plan of care:    Randy Martins is a 79year old male. HPI:     HPI    New patient complains that if someone shines a bright light in his eyes he loses his sight for about 30 seconds in the right eye. This started about a month ago. He states it doesn't happen in the left eye, and it doesn't happen all the time. He also complains that he notices floaters described as clear worms in the right eye for a couple of months which is random and doesn't happen with bright light in eyes. Consult: Jessica BE)    Last edited by Geovanni Cardoza OJAMES on 7/20/2023  2:02 PM.        Patient History:  Past Medical History:   Diagnosis Date    Back pain 04/05/2007    Hyperlipidemia        Surgical History: Randy Martins has a past surgical history that includes colonoscopy (09/2020); colonoscopy (N/A, 9/22/2020) (Procedure: COLONOSCOPY, POSSIBLE BIOPSY, POSSIBLE POLYPECTOMY 18715;  Surgeon: Ced Maza MD;  Location: [address] [place] [address]); and arthroscopy, shoulder, surgi (Right, 04/02/2008) (03/23/2009, 11/25/2009, ). Family History   Problem Relation Age of Onset    Heart Disorder Mother 80        Heart Attack    Other (Tumor) Father 76        Brain Tumor    Heart Disorder Sister 72        [facility] actual Dx name    Heart Disorder Brother 72        Quartupal Bypas    Stroke Brother 58    Diabetes Neg     Glaucoma Neg     Macular degeneration Neg        Social History:   Social History     Socioeconomic History    Marital status:    Tobacco Use    Smoking status: Never    Smokeless tobacco: Never   Vaping Use    Vaping Use: Never used   Substance and Sexual Activity    Alcohol use:  Yes     Alcohol/week: 1.0 standard drink of alcohol     Types: 1 Cans of beer per week     Comment: social    Drug use: Never       Medications:  Current Outpatient Medications   Medication Sig Dispense Refill    Multiple Vitamin (MULTIVITAMIN TAB/CAP) Take by mouth daily. B Complex Oral Tab 1 tablet daily      Garlic-DHA-EPA (GARLIC/EPA OR) garlic      ergocalciferol 1.25 MG (42381 UT) Oral Cap Take 1 capsule (50,000 Units total) by mouth once a week. 12 capsule 0       Allergies:  No Known Allergies    ROS:     ROS    Positive for: Eyes  Negative for: Constitutional, Gastrointestinal, Neurological, Skin, Genitourinary, Musculoskeletal, HENT, Endocrine, Cardiovascular, Respiratory, Psychiatric, Allergic/Imm, Heme/Lymph  Last edited by Emmy De Leon OKenishaTKenisha on 7/20/2023  2:04 PM.          PHYSICAL EXAM:     Base Eye Exam       Visual Acuity (Snellen - Linear)         Right Left    Dist sc 20/20 20/20              Tonometry (Icare, 2:00 PM)         Right Left    Pressure 16 17              Pupils         Pupils    Right PERRL    Left PERRL              Dilation       Both eyes: 1.0% Mydriacyl and 2.5% Azael Synephrine x2 @ 2:00 PM                  Additional Tests       Amsler         Right Left     Normal Normal                  Slit Lamp and Fundus Exam       Slit Lamp Exam         Right Left    Lids/Lashes Dermatochalasis, Meibomian gland dysfunction Dermatochalasis, Meibomian gland dysfunction    Conjunctiva/Sclera Normal Normal    Cornea 1+ Arcus 1+ Arcus    Anterior Chamber Deep and quiet Deep and quiet    Iris Normal Normal    Lens 1+ Nuclear sclerosis 1+ Nuclear sclerosis    Vitreous Clear, no floater seen Clear, no floater seen              Fundus Exam         Right Left    Disc Good rim, Temporal crescent Good rim, Temporal crescent    C/D Ratio 0.6 0.6    Macula Normal Normal    Vessels Normal Normal    Periphery Normal Normal                     ASSESSMENT/PLAN:     Diagnoses and Plan:     Visual disturbance  No cause found for visual disturbance in the right eye. Reassured patient that there is no retina issues, no optic nerve issues, no infection, no inflammation or increased pressure in his eyes. Information given to see Neuro-Ophthalmology for consultation and treatment. Patient will call if he wants a second opinion. Age-related nuclear cataract of both eyes  Discussed early cataracts with patient. Told patient that cataracts are age appropriate and they are not surgical at this time. No treatment recommended at this time. No orders of the defined types were placed in this encounter. Meds This Visit:  Requested Prescriptions      No prescriptions requested or ordered in this encounter        Follow up instructions:  Return in about 1 year (around 7/20/2024) for EE.    7/20/2023  Scribed by: Atiya Terry MD        If you have questions, please do not hesitate to call me. I look forward to following Jono Thorpe along with you.     Sincerely,        Atiya Terry MD        CC:   No Recipients    Document electronically generated by: Atiya Terry MD

## (undated) NOTE — LETTER
22      Patient: Lisa Chao  : 1955 Visit date: 2022    Dear Casi Ely,      I examined your patient in consultation today. He presents with intermittent edema and blue discoloration of his left middle finger, on a daily basis, and which subsides after 1 to 2 hours. The frequency and intensity are decreasing. He has no functional problems. His physical exam is normal including wrist and digital Karri tests. He is asymptomatic today. As this is subsiding, I would not recommend any further work-up or intervention. If his symptoms do not subside completely within 4 to 6 weeks, I would recommend he see rheumatology. Thank you for your kind referral. If I may answer any questions, please feel free to contact me.      Sincerely,   Zuleima Sierra MD     CC:   No Recipients

## (undated) NOTE — LETTER
[place] [address]  Authorization for Invasive Procedures  Date: 04/04/2023           Time: 1445    I hereby authorize Dr Bentley, my physician and his/her assistants (if applicable), which may include medical students, residents, and/or fellows, to perform the following surgical operation/ procedure and administer such anesthesia as may be determined necessary by my physician:  Operation/Procedure name (s)  Cardiac Catheterization, Left Ventricular Cineangiography, Bilateral Selective Coronary Angiography and/or Right Heart Catheterization; possible Percutaneous Transluminal Coronary Angioplasty, Coronary Atherectomy, Coronary Stent, Intracoronary Thrombolytic therapy, Antiplatelet therapy and/or Intravascular Ultrasound on Glenn Torres   2.   I recognize that during the surgical operation/procedure, unforeseen conditions may necessitate additional or different procedures than those listed above.  I, therefore, further authorize and request that the above-named surgeon, assistants, or designees perform such procedures as are, in their judgment, necessary and desirable.    3.   My surgeon/physician has discussed prior to my surgery the potential benefits, risks and side effects of this procedure; the likelihood of achieving goals; and potential problems that might occur during recuperation.  They also discussed reasonable alternatives to the procedure, including risks, benefits, and side effects related to the alternatives and risks related to not receiving this procedure.  I have had all my questions answered and I acknowledge that no guarantee has been made as to the result that may be obtained.    4.   Should the need arise during my operation/procedure, which includes change of level of care prior to discharge, I also consent to the administration of blood and/or blood products.  Further, I understand that despite careful testing and screening of blood or blood products by collecting  agencies, I may still be subject to ill effects as a result of receiving a blood transfusion and/or blood products.  The following are some, but not all, of the potential risks that can occur: fever and allergic reactions, hemolytic reactions, transmission of diseases such as Hepatitis, AIDS and Cytomegalovirus (CMV) and fluid overload.  In the event that I wish to have an autologous transfusion of my own blood, or a directed donor transfusion, I will discuss this with my physician.  Check only if Refusing Blood or Blood Products  I understand refusal of blood or blood products as deemed necessary by my physician may have serious consequences to my condition to include possible death. I hereby assume responsibility for my refusal and release the hospital, its personnel, and my physicians from any responsibility for the consequences of my refusal.          o  Refuse      5.   I authorize the use of any specimen, organs, tissues, body parts or foreign objects that may be removed from my body during the operation/procedure for diagnosis, research or teaching purposes and their subsequent disposal by hospital authorities.  I also authorize the release of specimen test results and/or written reports to my treating physician on the hospital medical staff or other referring or consulting physicians involved in my care, at the discretion of the Pathologist or my treating physician.    6.   I consent to the photographing or videotaping of the operations or procedures to be performed, including appropriate portions of my body for medical, scientific, or educational purposes, provided my identity is not revealed by the pictures or by descriptive texts accompanying them.  If the procedure has been photographed/videotaped, the surgeon will obtain the original picture, image, videotape or CD.  The hospital will not be responsible for storage, release or maintenance of the picture, image, tape or CD.    7.   I consent to the  presence of a  or observers in the operating room as deemed necessary by my physician or their designees.    8.   I recognize that in the event my procedure results in extended X-Ray/fluoroscopy time, I may develop a skin reaction.    9. If I have a Do Not Attempt Resuscitation (DNAR) order in place, that status will be suspended while in the operating room, procedural suite, and during the recovery period unless otherwise explicitly stated by me (or a person authorized to consent on my behalf). The surgeon or my attending physician will determine when the applicable recovery period ends for purposes of reinstating the DNAR order.  10. Patients having a sterilization procedure: I understand that if the procedure is successful the results will be permanent and it will therefore be impossible for me to inseminate, conceive, or bear children.  I also understand that the procedure is intended to result in sterility, although the result has not been guaranteed.   11. I acknowledge that my physician has explained sedation/analgesia administration to me including the risk and benefits I consent to the administration of sedation/analgesia as may be necessary or desirable in the judgment of my physician.    I CERTIFY THAT I HAVE READ AND FULLY UNDERSTAND THE ABOVE CONSENT TO OPERATION and/or OTHER PROCEDURE.        ____________________________________       _________________________________      ______________________________  Signature of Patient         Signature of Responsible Person        Printed Name of Responsible Person    ____________________________________      _________________________________      ______________________________       Signature of Witness          Relationship to Patient                       Date                                       Time  Patient Name: Glenn Torres  : 1955    Reviewed: 2024   Printed: 2024  Medical Record #: [id number] Page 1 of 2            STATEMENT OF PHYSICIAN My signature below affirms that prior to the time of the procedure; I have explained to the patient and/or his/her legal representative, the risks and benefits involved in the proposed treatment and any reasonable alternative to the proposed treatment. I have also explained the risks and benefits involved in refusal of the proposed treatment and alternatives to the proposed treatment and have answered the patient's questions. If I have a significant financial interest in a co-management agreement or a significant financial interest in any product or implant, or other significant relationship used in this procedure/surgery, I have disclosed this and had a discussion with my patient.     _______________________________________________________________ _____________________________  (Signature of Physician)                                                                                         (Date)                                   (Time)  Patient Name: Glenn Torres  : 1955    Reviewed: 2024   Printed: 2024  Medical Record #: [id number] Page 2 of 2